# Patient Record
Sex: MALE | Race: WHITE | NOT HISPANIC OR LATINO | Employment: OTHER | URBAN - NONMETROPOLITAN AREA
[De-identification: names, ages, dates, MRNs, and addresses within clinical notes are randomized per-mention and may not be internally consistent; named-entity substitution may affect disease eponyms.]

---

## 2017-01-11 PROBLEM — N28.9 KIDNEY DISEASE: Status: ACTIVE | Noted: 2017-01-11

## 2017-01-11 PROBLEM — G47.33 OSA (OBSTRUCTIVE SLEEP APNEA): Status: ACTIVE | Noted: 2017-01-11

## 2017-01-11 PROBLEM — E66.9 OBESITY: Status: ACTIVE | Noted: 2017-01-11

## 2017-01-11 PROBLEM — I48.91 ATRIAL FIBRILLATION (HCC): Status: ACTIVE | Noted: 2017-01-11

## 2017-01-11 PROBLEM — E78.5 HLD (HYPERLIPIDEMIA): Status: ACTIVE | Noted: 2017-01-11

## 2017-01-11 PROBLEM — E11.9 DM (DIABETES MELLITUS) (HCC): Status: ACTIVE | Noted: 2017-01-11

## 2017-01-11 PROBLEM — I10 HTN (HYPERTENSION): Status: ACTIVE | Noted: 2017-01-11

## 2017-01-11 RX ORDER — CHLORAL HYDRATE 500 MG
CAPSULE ORAL 2 TIMES DAILY WITH MEALS
COMMUNITY

## 2017-01-11 RX ORDER — LORAZEPAM 0.5 MG/1
0.5 TABLET ORAL
COMMUNITY

## 2017-01-11 RX ORDER — BUDESONIDE AND FORMOTEROL FUMARATE DIHYDRATE 160; 4.5 UG/1; UG/1
2 AEROSOL RESPIRATORY (INHALATION)
COMMUNITY
End: 2017-01-12

## 2017-01-11 RX ORDER — FUROSEMIDE 40 MG/1
40 TABLET ORAL DAILY
COMMUNITY

## 2017-01-11 RX ORDER — ASPIRIN 81 MG/1
81 TABLET ORAL DAILY
COMMUNITY
End: 2020-01-01

## 2017-01-11 RX ORDER — ESZOPICLONE 3 MG/1
3 TABLET, FILM COATED ORAL NIGHTLY
COMMUNITY

## 2017-01-11 RX ORDER — CHOLECALCIFEROL (VITAMIN D3) 50 MCG
2000 TABLET ORAL DAILY
COMMUNITY
End: 2017-01-12

## 2017-01-11 RX ORDER — CALCIUM POLYCARBOPHIL 625 MG 625 MG/1
625 TABLET ORAL DAILY
COMMUNITY
End: 2017-01-12

## 2017-01-11 RX ORDER — ALLOPURINOL 100 MG/1
100 TABLET ORAL DAILY
COMMUNITY

## 2017-01-11 RX ORDER — POTASSIUM CHLORIDE 20 MEQ/1
20 TABLET, EXTENDED RELEASE ORAL 2 TIMES DAILY
COMMUNITY
End: 2017-01-12 | Stop reason: ALTCHOICE

## 2017-01-11 RX ORDER — CETIRIZINE HYDROCHLORIDE 10 MG/1
10 TABLET ORAL DAILY
COMMUNITY
End: 2017-07-28

## 2017-01-11 RX ORDER — PANTOPRAZOLE SODIUM 40 MG/1
40 TABLET, DELAYED RELEASE ORAL DAILY
COMMUNITY
End: 2017-01-12

## 2017-01-11 RX ORDER — CARVEDILOL 25 MG/1
25 TABLET ORAL 2 TIMES DAILY WITH MEALS
COMMUNITY

## 2017-01-11 RX ORDER — QUETIAPINE FUMARATE 100 MG/1
100 TABLET, FILM COATED ORAL NIGHTLY
COMMUNITY

## 2017-01-12 ENCOUNTER — OFFICE VISIT (OUTPATIENT)
Dept: OTOLARYNGOLOGY | Facility: CLINIC | Age: 67
End: 2017-01-12

## 2017-01-12 VITALS
WEIGHT: 284 LBS | HEART RATE: 84 BPM | TEMPERATURE: 98.8 F | SYSTOLIC BLOOD PRESSURE: 154 MMHG | DIASTOLIC BLOOD PRESSURE: 78 MMHG | HEIGHT: 73 IN | BODY MASS INDEX: 37.64 KG/M2

## 2017-01-12 DIAGNOSIS — K11.5 SIALOLITHIASIS: Primary | ICD-10-CM

## 2017-01-12 PROCEDURE — 1036F TOBACCO NON-USER: CPT | Performed by: OTOLARYNGOLOGY

## 2017-01-12 PROCEDURE — 99203 OFFICE O/P NEW LOW 30 MIN: CPT | Performed by: OTOLARYNGOLOGY

## 2017-01-12 RX ORDER — NIFEDIPINE 30 MG/1
30 TABLET, EXTENDED RELEASE ORAL DAILY
COMMUNITY
End: 2020-01-01

## 2017-01-12 RX ORDER — FLUTICASONE PROPIONATE 50 MCG
2 SPRAY, SUSPENSION (ML) NASAL AS NEEDED
COMMUNITY

## 2017-01-12 RX ORDER — ESOMEPRAZOLE MAGNESIUM 40 MG/1
40 CAPSULE, DELAYED RELEASE ORAL
COMMUNITY
End: 2017-07-28 | Stop reason: ALTCHOICE

## 2017-01-12 NOTE — LETTER
January 12, 2017     Ad Flaherty MD  [address] [facility] [address]    Patient: Neal Rock   YOB: 1950   Date of Visit: 1/12/2017       Dear Dr. Reynold MD:    Thank you for referring Neal Rock to me for evaluation. Below are the relevant portions of my assessment and plan of care.    If you have questions, please do not hesitate to call me. I look forward to following Neal along with you.         Sincerely,        Dov Ritter MD        CC: No Recipients  Dov Ritter MD  1/12/2017  3:41 PM  Signed  PRIMARY CARE PROVIDER: Ad Flaherty MD  REFERRING PROVIDER: No ref. provider found    Chief Complaint   Patient presents with   • Facial Swelling     Swelling in right side of face        Subjective   History of Present Illness:  Neal Rock is a  66 y.o.  male who complains of neck mass. The symptoms are localized to the right submandibular region with mild left submandibular swelling. The patient has had moderate symptoms. The symptoms have been acutely occuring for the last several months . The symptoms are aggravated by  no identifiable factors . The symptoms are improved by no identifieable factors. The patient complains of dry mouth.     Review of Systems:  Review of Systems   Constitutional: Negative for chills and fever.   HENT:        See HPI   Eyes: Negative.    Respiratory: Positive for shortness of breath. Negative for cough.    Cardiovascular: Negative.    Gastrointestinal: Negative for nausea and vomiting.   Allergic/Immunologic: Positive for environmental allergies. Negative for food allergies.   Neurological: Negative for dizziness and headaches.   Psychiatric/Behavioral: Negative.        Past History:  Past Medical History   Diagnosis Date   • Atrial fibrillation 01/11/2017   • Diverticulitis    • DM (diabetes mellitus) 01/11/2017   • HLD (hyperlipidemia) 01/11/2017   • HTN (hypertension) 01/11/2017   • Kidney disease 01/11/2017   • Obesity  01/11/2017   • BOB (obstructive sleep apnea) 01/11/2017     Past Surgical History   Procedure Laterality Date   • Bladder repair     • Colon surgery     • Back surgery     • Cardioversion     • Colonoscopy     • Eye surgery     • Hernia repair       No family history on file.  Social History   Substance Use Topics   • Smoking status: Former Smoker     Types: Cigarettes     Quit date: 1993   • Smokeless tobacco: Never Used   • Alcohol use 1.8 oz/week     3 Glasses of wine per week      Comment: Moderate        Current Outpatient Prescriptions:   •  allopurinol (ZYLOPRIM) 100 MG tablet, Take 100 mg by mouth Daily., Disp: , Rfl:   •  aspirin 81 MG EC tablet, Take 81 mg by mouth Daily., Disp: , Rfl:   •  carvedilol (COREG) 25 MG tablet, Take 25 mg by mouth 2 (Two) Times a Day With Meals., Disp: , Rfl:   •  cetirizine (zyrTEC) 10 MG tablet, Take 10 mg by mouth Daily., Disp: , Rfl:   •  esomeprazole (nexIUM) 40 MG capsule, Take 40 mg by mouth Every Morning Before Breakfast., Disp: , Rfl:   •  eszopiclone (LUNESTA) 3 MG tablet, Take 3 mg by mouth Every Night. Take immediately before bedtime, Disp: , Rfl:   •  fluticasone (FLONASE) 50 MCG/ACT nasal spray, 2 sprays into each nostril 2 (Two) Times a Day., Disp: , Rfl:   •  furosemide (LASIX) 20 MG tablet, Take 20 mg by mouth 2 (Two) Times a Day., Disp: , Rfl:   •  LORazepam (ATIVAN) 0.5 MG tablet, Take 0.5 mg by mouth Every 8 (Eight) Hours As Needed for anxiety., Disp: , Rfl:   •  magnesium oxide (MAGOX) 400 (241.3 MG) MG tablet tablet, Take 400 mg by mouth Daily., Disp: , Rfl:   •  Multiple Vitamin (MULTIVITAMINS PO), Take  by mouth., Disp: , Rfl:   •  NIFEdipine XL (PROCARDIA XL) 30 MG 24 hr tablet, Take 30 mg by mouth Daily., Disp: , Rfl:   •  Omega-3 Fatty Acids (FISH OIL) 1000 MG capsule capsule, Take  by mouth Daily With Breakfast., Disp: , Rfl:   •  Probiotic Product (PROBIOTIC PO), Take  by mouth., Disp: , Rfl:   •  psyllium (KONSYL) 28.3 % pack pack, Take  by mouth  Daily., Disp: , Rfl:   •  QUEtiapine (SEROquel) 25 MG tablet, Take 25 mg by mouth Every Night., Disp: , Rfl:   Allergies:  Ace inhibitors; Bee venom; Nsaids; and Wasp venom    Objective     Vital Signs:  Temp:  [98.8 °F (37.1 °C)] 98.8 °F (37.1 °C)  Heart Rate:  [84] 84  BP: (154)/(78) 154/78    Physical Exam:  CONSTITUTIONAL: well nourished, well-developed, alert, oriented, in no acute distress, obese  COMMUNICATION AND VOICE: able to communicate normally, normal voice quality  HEAD: normocephalic, no lesions, atraumatic, no tenderness, no masses   FACE: appearance normal, no lesions, no tenderness, no deformities, facial motion symmetric  SALIVARY GLANDS: parotid glands with no tenderness, no swelling, no masses, submandibular glands with prominent size upon palpation on right side, nontender, good salivary flow with palpation, no stones  EYES: ocular motility normal, eyelids normal, orbits normal, no proptosis, conjunctiva normal , pupils equal, round   EARS:  Hearing: response to conversational voice normal bilaterally   External Ears: auricles without lesions  Otoscopic: tympanic membrane appearance normal, no lesions, no perforation, normal mobility, no fluid  NOSE:  External Nose: structure normal, no tenderness on palpation, no nasal discharge, no lesions, no evidence of trauma, nostrils patent   Intranasal Exam: nasal mucosa dry, vestibule within normal limits, inferior turbinate normal, nasal septum midline   Nasopharynx: nasopharyngeal mucosa, adenoids within normal limits  ORAL:  Lips: upper and lower lips without lesion   Teeth: dentition within normal limits for age   Gums: gingivae healthy   Oral Mucosa: oral mucosa normal, no mucosal lesions   Floor of Mouth: Warthin’s duct patent, mucosa normal  Tongue: lingual mucosa normal without lesions, normal tongue mobility, enlarged in size  Palate: soft and hard palates with normal mucosa and structure  Oropharynx: oropharyngeal mucosa normal, tonsils  normal in appearance   HYPOPHARYNX: hypopharyngeal mucosa normal  LARYNX: epiglottis and arytenoid cartilage within normal limits, vocal cord mucosa normal with normal mobility   NECK: neck appearance normal, no masses or tenderness  THYROID: no overt thyromegaly, no tenderness, nodules or mass present on palpation, position midline   LYMPH NODES: no lymphadenopathy  CHEST/RESPIRATORY: respiratory effort normal, normal breath sounds   CARDIOVASCULAR: rate and rhythm normal, extremities without cyanosis or edema    NEUROLOGIC/PSYCHIATRIC: oriented to time, place and person, mood normal, affect appropriate, CN II-XII intact grossly    Results Review:   CT soft tissue neck reviewed:     Assessment   1. Sialolithiasis        Plan   Conservative management.    -----INSTRUCTIONS-----  Warm compresses three times a day with massage.  Use over the counter non steroidal anti inflammatory medication such as Motrin (ibuprofen) or Alieve/ Naprosyn (naproxen) as needed for pain and inflammation relief.  Use saliva producing measures with sour candy or the use of lemon with water.     -----FOLLOW UP-----  Return in about 4 months (around 5/12/2017) for with ARELI Dave.        Dov Ritter MD  01/12/17  3:40 PM

## 2017-01-12 NOTE — MR AVS SNAPSHOT
Neal Shweta   1/12/2017 3:00 PM   Office Visit    Dept Phone:  [phone]   Encounter #:  [id number]    Provider:  Dov Ritter MD   Department:  [facility]                Your Full Care Plan              Today's Medication Changes          These changes are accurate as of: 1/12/17  3:48 PM.  If you have any questions, ask your nurse or doctor.               Stop taking medication(s)listed here:     budesonide-formoterol 160-4.5 MCG/ACT inhaler   Commonly known as:  SYMBICORT   Stopped by:  Dov Ritter MD           pantoprazole 40 MG EC tablet   Commonly known as:  PROTONIX   Stopped by:  Dov Ritter MD           polycarbophil 625 MG tablet   Commonly known as:  FIBERCON   Stopped by:  Dov Ritter MD           potassium chloride 20 MEQ CR tablet   Commonly known as:  K-DUR,KLOR-CON   Stopped by:  Dov Ritter MD           Vitamin D 2000 UNITS tablet   Stopped by:  Dov Ritter MD                      Your Updated Medication List          This list is accurate as of: 1/12/17  3:48 PM.  Always use your most recent med list.                allopurinol 100 MG tablet   Commonly known as:  ZYLOPRIM       aspirin 81 MG EC tablet       carvedilol 25 MG tablet   Commonly known as:  COREG       cetirizine 10 MG tablet   Commonly known as:  zyrTEC       esomeprazole 40 MG capsule   Commonly known as:  nexIUM       fish oil 1000 MG capsule capsule       fluticasone 50 MCG/ACT nasal spray   Commonly known as:  FLONASE       furosemide 20 MG tablet   Commonly known as:  LASIX       LORazepam 0.5 MG tablet   Commonly known as:  ATIVAN       LUNESTA 3 MG tablet   Generic drug:  eszopiclone       magnesium oxide 400 (241.3 MG) MG tablet tablet   Commonly known as:  MAGOX       MULTIVITAMINS PO       NIFEdipine XL 30 MG 24 hr tablet   Commonly known as:  PROCARDIA XL       PROBIOTIC PO       psyllium 28.3 % pack pack   Commonly known  as:  KONSYL       QUEtiapine 25 MG tablet   Commonly known as:  SEROquel               You Were Diagnosed With        Codes Comments    Sialolithiasis    -  Primary ICD-10-CM: K11.5  ICD-9-CM: 527.5       Instructions    Warm compresses three times a day with massage.  Use over the counter non steroidal anti inflammatory medication such as Motrin (ibuprofen) or Alieve/ Naprosyn (naproxen) as needed for pain and inflammation relief.  Use saliva producing measures with sour candy or the use of lemon with water.     Patient Instructions History      Upcoming Appointments     Visit Type Date Time Department    NEW PATIENT 2017  3:00 PM [facility]    FOLLOW UP 2017  3:00 PM [facility]    FOLLOW UP 2017  3:30 PM [facility]      [employer] Signup     [facility] [employer] allows you to send messages to your doctor, view your test results, renew your prescriptions, schedule appointments, and more. To sign up, go to [employer] and click on the Sign Up Now link in the New User? box. Enter your [employer] Activation Code exactly as it appears below along with the last four digits of your Social Security Number and your Date of Birth () to complete the sign-up process. If you do not sign up before the expiration date, you must request a new code.    [employer] Activation Code: [email or web address]  Expires: 2017  3:48 PM    If you have questions, you can email [email or web address] or call [phone] to talk to our [employer] staff. Remember, [employer] is NOT to be used for urgent needs. For medical emergencies, dial 911.               Other Info from Your Visit           Your Appointments     2017  3:00 PM CST   Follow Up with [facility]   [facility] (--)    [address] [place] [address] [address]  [facility] [id number] [phone]           Arrive 15 minutes prior to appointment.            May 16, 2017  3:30 PM CDT   Follow Up with Daniela  "ARELI Berry   [facility] (--)    [address] [facility]   [address]  [facility] [address] [phone]           Arrive 15 minutes prior to appointment.              Allergies     Ace Inhibitors      Bee Venom      Nsaids      Wasp Venom        Reason for Visit     Facial Swelling Swelling in right side of face       Vital Signs     Blood Pressure Pulse Temperature Height Weight Body Mass Index    154/78 84 98.8 °F (37.1 °C) 73\" (185.4 cm) 284 lb (129 kg) 37.47 kg/m2    Smoking Status                   Former Smoker           Problems and Diagnoses Noted     Salivary gland stone        "

## 2017-01-12 NOTE — PATIENT INSTRUCTIONS
Warm compresses three times a day with massage.  Use over the counter non steroidal anti inflammatory medication such as Motrin (ibuprofen) or Alieve/ Naprosyn (naproxen) as needed for pain and inflammation relief.  Use saliva producing measures with sour candy or the use of lemon with water.

## 2017-01-12 NOTE — PROGRESS NOTES
PRIMARY CARE PROVIDER: Ad Flaherty MD  REFERRING PROVIDER: No ref. provider found    Chief Complaint   Patient presents with   • Facial Swelling     Swelling in right side of face        Subjective   History of Present Illness:  Neal Rock is a  66 y.o.  male who complains of neck mass. The symptoms are localized to the right submandibular region with mild left submandibular swelling. The patient has had moderate symptoms. The symptoms have been acutely occuring for the last several months . The symptoms are aggravated by  no identifiable factors . The symptoms are improved by no identifieable factors. The patient complains of dry mouth.     Review of Systems:  Review of Systems   Constitutional: Negative for chills and fever.   HENT:        See HPI   Eyes: Negative.    Respiratory: Positive for shortness of breath. Negative for cough.    Cardiovascular: Negative.    Gastrointestinal: Negative for nausea and vomiting.   Allergic/Immunologic: Positive for environmental allergies. Negative for food allergies.   Neurological: Negative for dizziness and headaches.   Psychiatric/Behavioral: Negative.        Past History:  Past Medical History   Diagnosis Date   • Atrial fibrillation 01/11/2017   • Diverticulitis    • DM (diabetes mellitus) 01/11/2017   • HLD (hyperlipidemia) 01/11/2017   • HTN (hypertension) 01/11/2017   • Kidney disease 01/11/2017   • Obesity 01/11/2017   • BOB (obstructive sleep apnea) 01/11/2017     Past Surgical History   Procedure Laterality Date   • Bladder repair     • Colon surgery     • Back surgery     • Cardioversion     • Colonoscopy     • Eye surgery     • Hernia repair       No family history on file.  Social History   Substance Use Topics   • Smoking status: Former Smoker     Types: Cigarettes     Quit date: 1993   • Smokeless tobacco: Never Used   • Alcohol use 1.8 oz/week     3 Glasses of wine per week      Comment: Moderate        Current Outpatient Prescriptions:   •   allopurinol (ZYLOPRIM) 100 MG tablet, Take 100 mg by mouth Daily., Disp: , Rfl:   •  aspirin 81 MG EC tablet, Take 81 mg by mouth Daily., Disp: , Rfl:   •  carvedilol (COREG) 25 MG tablet, Take 25 mg by mouth 2 (Two) Times a Day With Meals., Disp: , Rfl:   •  cetirizine (zyrTEC) 10 MG tablet, Take 10 mg by mouth Daily., Disp: , Rfl:   •  esomeprazole (nexIUM) 40 MG capsule, Take 40 mg by mouth Every Morning Before Breakfast., Disp: , Rfl:   •  eszopiclone (LUNESTA) 3 MG tablet, Take 3 mg by mouth Every Night. Take immediately before bedtime, Disp: , Rfl:   •  fluticasone (FLONASE) 50 MCG/ACT nasal spray, 2 sprays into each nostril 2 (Two) Times a Day., Disp: , Rfl:   •  furosemide (LASIX) 20 MG tablet, Take 20 mg by mouth 2 (Two) Times a Day., Disp: , Rfl:   •  LORazepam (ATIVAN) 0.5 MG tablet, Take 0.5 mg by mouth Every 8 (Eight) Hours As Needed for anxiety., Disp: , Rfl:   •  magnesium oxide (MAGOX) 400 (241.3 MG) MG tablet tablet, Take 400 mg by mouth Daily., Disp: , Rfl:   •  Multiple Vitamin (MULTIVITAMINS PO), Take  by mouth., Disp: , Rfl:   •  NIFEdipine XL (PROCARDIA XL) 30 MG 24 hr tablet, Take 30 mg by mouth Daily., Disp: , Rfl:   •  Omega-3 Fatty Acids (FISH OIL) 1000 MG capsule capsule, Take  by mouth Daily With Breakfast., Disp: , Rfl:   •  Probiotic Product (PROBIOTIC PO), Take  by mouth., Disp: , Rfl:   •  psyllium (KONSYL) 28.3 % pack pack, Take  by mouth Daily., Disp: , Rfl:   •  QUEtiapine (SEROquel) 25 MG tablet, Take 25 mg by mouth Every Night., Disp: , Rfl:   Allergies:  Ace inhibitors; Bee venom; Nsaids; and Wasp venom    Objective     Vital Signs:  Temp:  [98.8 °F (37.1 °C)] 98.8 °F (37.1 °C)  Heart Rate:  [84] 84  BP: (154)/(78) 154/78    Physical Exam:  CONSTITUTIONAL: well nourished, well-developed, alert, oriented, in no acute distress, obese  COMMUNICATION AND VOICE: able to communicate normally, normal voice quality  HEAD: normocephalic, no lesions, atraumatic, no tenderness, no masses    FACE: appearance normal, no lesions, no tenderness, no deformities, facial motion symmetric  SALIVARY GLANDS: parotid glands with no tenderness, no swelling, no masses, submandibular glands with prominent size upon palpation on right side, nontender, good salivary flow with palpation, no stones  EYES: ocular motility normal, eyelids normal, orbits normal, no proptosis, conjunctiva normal , pupils equal, round   EARS:  Hearing: response to conversational voice normal bilaterally   External Ears: auricles without lesions  Otoscopic: tympanic membrane appearance normal, no lesions, no perforation, normal mobility, no fluid  NOSE:  External Nose: structure normal, no tenderness on palpation, no nasal discharge, no lesions, no evidence of trauma, nostrils patent   Intranasal Exam: nasal mucosa dry, vestibule within normal limits, inferior turbinate normal, nasal septum midline   Nasopharynx: nasopharyngeal mucosa, adenoids within normal limits  ORAL:  Lips: upper and lower lips without lesion   Teeth: dentition within normal limits for age   Gums: gingivae healthy   Oral Mucosa: oral mucosa normal, no mucosal lesions   Floor of Mouth: Warthin’s duct patent, mucosa normal  Tongue: lingual mucosa normal without lesions, normal tongue mobility, enlarged in size  Palate: soft and hard palates with normal mucosa and structure  Oropharynx: oropharyngeal mucosa normal, tonsils normal in appearance   HYPOPHARYNX: hypopharyngeal mucosa normal  LARYNX: epiglottis and arytenoid cartilage within normal limits, vocal cord mucosa normal with normal mobility   NECK: neck appearance normal, no masses or tenderness  THYROID: no overt thyromegaly, no tenderness, nodules or mass present on palpation, position midline   LYMPH NODES: no lymphadenopathy  CHEST/RESPIRATORY: respiratory effort normal, normal breath sounds   CARDIOVASCULAR: rate and rhythm normal, extremities without cyanosis or edema    NEUROLOGIC/PSYCHIATRIC: oriented to  time, place and person, mood normal, affect appropriate, CN II-XII intact grossly    Results Review:   CT soft tissue neck reviewed:     Assessment   1. Sialolithiasis        Plan   Conservative management.    -----INSTRUCTIONS-----  Warm compresses three times a day with massage.  Use over the counter non steroidal anti inflammatory medication such as Motrin (ibuprofen) or Alieve/ Naprosyn (naproxen) as needed for pain and inflammation relief.  Use saliva producing measures with sour candy or the use of lemon with water.     -----FOLLOW UP-----  Return in about 4 months (around 5/12/2017) for with ARELI Dave.        Dov Ritter MD  01/12/17  3:40 PM

## 2017-01-18 ENCOUNTER — OFFICE VISIT (OUTPATIENT)
Dept: CARDIOLOGY | Facility: CLINIC | Age: 67
End: 2017-01-18

## 2017-01-18 VITALS
DIASTOLIC BLOOD PRESSURE: 68 MMHG | WEIGHT: 285.2 LBS | BODY MASS INDEX: 37.8 KG/M2 | HEART RATE: 82 BPM | HEIGHT: 73 IN | SYSTOLIC BLOOD PRESSURE: 160 MMHG

## 2017-01-18 DIAGNOSIS — I10 ESSENTIAL HYPERTENSION: ICD-10-CM

## 2017-01-18 DIAGNOSIS — R60.0 BILATERAL EDEMA OF LOWER EXTREMITY: ICD-10-CM

## 2017-01-18 DIAGNOSIS — I48.0 PAROXYSMAL ATRIAL FIBRILLATION (HCC): Primary | ICD-10-CM

## 2017-01-18 DIAGNOSIS — I51.89 DIASTOLIC DYSFUNCTION: ICD-10-CM

## 2017-01-18 DIAGNOSIS — E78.2 MIXED HYPERLIPIDEMIA: ICD-10-CM

## 2017-01-18 DIAGNOSIS — G47.33 OSA (OBSTRUCTIVE SLEEP APNEA): ICD-10-CM

## 2017-01-18 PROBLEM — N28.9 KIDNEY DISEASE: Status: RESOLVED | Noted: 2017-01-11 | Resolved: 2017-01-18

## 2017-01-18 PROCEDURE — 99214 OFFICE O/P EST MOD 30 MIN: CPT | Performed by: PHYSICIAN ASSISTANT

## 2017-01-18 PROCEDURE — 93000 ELECTROCARDIOGRAM COMPLETE: CPT | Performed by: PHYSICIAN ASSISTANT

## 2017-01-18 RX ORDER — FERROUS SULFATE 325(65) MG
325 TABLET ORAL EVERY OTHER DAY
COMMUNITY
End: 2018-07-30

## 2017-01-18 NOTE — PROGRESS NOTES
Subjective:     Encounter Date:01/18/2017      Patient ID: Neal Rock is a 66 y.o. male with history of diastolic dysfunction, HTN, HLD, BOB, pAF who [facility]. The pt tells me he's been doing well since his last visit. He denies any CP, dyspnea, palpitations, dizziness, or other associated symptom. He also denies tobacco abuse. He does report BLE edema, increased more than usual. He states this has been present x a couple months now. The pt also tells me that he has had a recent hernia repair in July and that he had complications with bleeding, which required transfusion with 2 units. He has since had residual fatigue, decreasing with PT. The pt had an episode of AFib over 1 year ago and was placed on Coumadin for subsequent DCCV. It was successful and Dr. Soni took him off of Coumadin a few weeks afterwards.   Pt reports he will have lipid panel from PCP faxed to our office.   Of note, the pt had a cardiac cath performed in 2015 that showed revealed normal coronary arteries     Chief Complaint:  Atrial Fibrillation   Presents for follow-up visit. Symptoms are negative for chest pain, dizziness, palpitations, shortness of breath and syncope. Past medical history includes atrial fibrillation and hyperlipidemia.   Hypertension   This is a chronic problem. The problem is controlled. Associated symptoms include malaise/fatigue. Pertinent negatives include no chest pain, orthopnea, palpitations, PND or shortness of breath. There is no history of chronic renal disease.   Hyperlipidemia   This is a chronic problem. The current episode started more than 1 year ago. The problem is controlled. Recent lipid tests were reviewed and are normal. He has no history of chronic renal disease or diabetes. Pertinent negatives include no chest pain, focal weakness, myalgias or shortness of breath.       The following portions of the patient's history were reviewed and updated as  appropriate: allergies, current medications, past family history, past medical history, past social history, past surgical history and problem list.    Review of Systems   Constitution: Positive for malaise/fatigue. Negative for weight gain.   Cardiovascular: Positive for leg swelling. Negative for chest pain, claudication, dyspnea on exertion, irregular heartbeat, near-syncope, orthopnea, palpitations, paroxysmal nocturnal dyspnea and syncope.   Respiratory: Negative for hemoptysis and shortness of breath.    Hematologic/Lymphatic: Negative for bleeding problem. Does not bruise/bleed easily.   Skin: Negative for rash.   Musculoskeletal: Negative for myalgias.   Gastrointestinal: Negative for melena, nausea and vomiting.   Genitourinary: Negative for hematuria.   Neurological: Negative for dizziness, focal weakness and light-headedness.   Psychiatric/Behavioral: Negative for depression. The patient is not nervous/anxious.    All other systems reviewed and are negative.        ECG 12 Lead  Date/Time: 1/18/2017 4:19 PM  Performed by: ERICA TOVAR  Authorized by: ERICA TOVAR   Comparison: compared with previous ECG from 11/11/2014  Similar to previous ECG  Comparison to previous ECG: Similar ST elevation in inferior leads. No PACs on previous  Rhythm: sinus rhythm  Ectopy: atrial premature contractions  Rate: normal  ST Elevation: II, III and aVF  QRS axis: normal                 Objective:     Physical Exam   Constitutional: He is oriented to person, place, and time. He appears well-developed and well-nourished.   HENT:   Head: Normocephalic and atraumatic.   Eyes: Conjunctivae and EOM are normal. Pupils are equal, round, and reactive to light.   Neck: Normal range of motion. Neck supple. No JVD present.   Cardiovascular: Normal rate, regular rhythm, S1 normal, S2 normal, normal heart sounds, intact distal pulses and normal pulses.    No murmur heard.  Pulmonary/Chest: Effort normal and breath sounds normal. No  respiratory distress.   Abdominal: Soft. Bowel sounds are normal. He exhibits no distension.   Musculoskeletal: He exhibits edema (trace BLE).   Neurological: He is alert and oriented to person, place, and time.   Skin: Skin is warm and dry.   Psychiatric: He has a normal mood and affect. Judgment normal.   Vitals reviewed.      Lab Review: will have lipid panel faxed      Assessment:          Diagnosis Plan   1. Paroxysmal atrial fibrillation  ECG 12 Lead   2. Essential hypertension      SBP 150s-160s average    3. Mixed hyperlipidemia      Checked by PCP recently. Upper limit of normal, per patient.    4. OBB (obstructive sleep apnea)      uses CPAP   5. Diastolic dysfunction  Adult Transthoracic Echo Complete With Contrast   6. Bilateral edema of lower extremity  Adult Transthoracic Echo Complete With Contrast          Plan:       1. Increase lasix to 40 mg daily  2. Echo (pt requests in [place], [place]), given hx of diastolic dysfn, and worsening edema  3. Will hold on anticoagulation for now, given recent hx of bleeding and need for transfusion. Additionally, his CHADS score at this time is 1. Will reevaluate again in the future.   4. Follow-up in 6 months, unless needed sooner  5. Instructed on diet, activity, medication compliance  6. Verbalized understanding of instructions

## 2017-01-18 NOTE — MR AVS SNAPSHOT
Neal Rock   1/18/2017 3:00 PM   Office Visit    Dept Phone:  [phone]   Encounter #:  [id number]    Provider:  [facility] NP   Department:  [facility]                Your Full Care Plan              Your Updated Medication List          This list is accurate as of: 1/18/17  4:25 PM.  Always use your most recent med list.                allopurinol 100 MG tablet   Commonly known as:  ZYLOPRIM       aspirin 81 MG EC tablet       carvedilol 25 MG tablet   Commonly known as:  COREG       cetirizine 10 MG tablet   Commonly known as:  zyrTEC       esomeprazole 40 MG capsule   Commonly known as:  nexIUM       ferrous sulfate 325 (65 FE) MG tablet       fish oil 1000 MG capsule capsule       fluticasone 50 MCG/ACT nasal spray   Commonly known as:  FLONASE       furosemide 20 MG tablet   Commonly known as:  LASIX       LORazepam 0.5 MG tablet   Commonly known as:  ATIVAN       LUNESTA 3 MG tablet   Generic drug:  eszopiclone       magnesium oxide 400 (241.3 MG) MG tablet tablet   Commonly known as:  MAGOX       MULTIVITAMINS PO       NIFEdipine XL 30 MG 24 hr tablet   Commonly known as:  PROCARDIA XL       PROBIOTIC PO       psyllium 28.3 % pack pack   Commonly known as:  KONSYL       QUEtiapine 25 MG tablet   Commonly known as:  SEROquel               We Performed the Following     ECG 12 Lead       You Were Diagnosed With        Codes Comments    Paroxysmal atrial fibrillation    -  Primary ICD-10-CM: I48.0  ICD-9-CM: 427.31     Essential hypertension     ICD-10-CM: I10  ICD-9-CM: 401.9 SBP 150s-160s average     Mixed hyperlipidemia     ICD-10-CM: E78.2  ICD-9-CM: 272.2 Checked by PCP recently. Upper limit of normal, per patient.     BOB (obstructive sleep apnea)     ICD-10-CM: G47.33  ICD-9-CM: 327.23 uses CPAP    Diastolic dysfunction     ICD-10-CM: I51.9  ICD-9-CM: 429.9     Bilateral edema of lower extremity     ICD-10-CM: R60.0  ICD-9-CM: 782.3          Instructions     None    Patient Instructions History      Upcoming Appointments     Visit Type Date Time Department    FOLLOW UP 2017  3:00 PM [facility]    FOLLOW UP 2017  3:30 PM [facility]    FOLLOW UP 2017  2:30 PM [facility]      MyChart Signup     [facility] allows you to send messages to your doctor, view your test results, renew your prescriptions, schedule appointments, and more. To sign up, go to [employer] and click on the Sign Up Now link in the New User? box. Enter your Intr[employer] Activation Code exactly as it appears below along with the last four digits of your Social Security Number and your Date of Birth () to complete the sign-up process. If you do not sign up before the expiration date, you must request a new code.    Intr[employer]iem Activation Code: [email or web address]  Expires: 2017  3:48 PM    If you have questions, you can email [email or web address] or call [phone] to talk to our Intrad[employer] staff. Remember, Intr[employer] is NOT to be used for urgent needs. For medical emergencies, dial 911.               Other Info from Your Visit           Your Appointments     May 16, 2017  3:30 PM CDT   Follow Up with ARELI Valdivia   [facility] (--)    [address]   [address]  [place] [address] [phone]           Arrive 15 minutes prior to appointment.            2017  2:30 PM CDT   Follow Up with [facility] NP   [facility] (--)    [address]  [place] [address] [phone]           Arrive 15 minutes prior to appointment.              Allergies     Ace Inhibitors      Bee Venom      Nsaids      Wasp Venom        Reason for Visit     Atrial Fibrillation 1 1/2 yr F/U. No trouble since cardioversion    Shortness of Breath Has had for a long time.    Hypertension Has been elevated      Vital Signs     Blood Pressure Pulse Height  "Weight Body Mass Index Smoking Status    160/68 82 73\" (185.4 cm) 285 lb 3.2 oz (129 kg) 37.63 kg/m2 Former Smoker      Problems and Diagnoses Noted     High cholesterol or triglycerides    High blood pressure    BOB (obstructive sleep apnea)    Atrial fibrillation (irregular heartbeat)    Diastolic dysfunction        Edema of both legs          No Longer an Issue     Kidney disorder        "

## 2017-04-10 ENCOUNTER — OFFICE VISIT (OUTPATIENT)
Dept: VASCULAR SURGERY | Age: 67
End: 2017-04-10
Payer: MEDICARE

## 2017-04-10 ENCOUNTER — HOSPITAL ENCOUNTER (OUTPATIENT)
Dept: VASCULAR LAB | Age: 67
Discharge: HOME | End: 2017-04-10
Payer: MEDICARE

## 2017-04-10 VITALS
OXYGEN SATURATION: 93 % | TEMPERATURE: 98.8 F | SYSTOLIC BLOOD PRESSURE: 132 MMHG | RESPIRATION RATE: 18 BRPM | DIASTOLIC BLOOD PRESSURE: 70 MMHG | HEART RATE: 69 BPM

## 2017-04-10 DIAGNOSIS — I65.23 BILATERAL CAROTID ARTERY STENOSIS: ICD-10-CM

## 2017-04-10 DIAGNOSIS — I65.23 BILATERAL CAROTID ARTERY STENOSIS: Primary | ICD-10-CM

## 2017-04-10 PROCEDURE — G8427 DOCREV CUR MEDS BY ELIG CLIN: HCPCS | Performed by: NURSE PRACTITIONER

## 2017-04-10 PROCEDURE — 93880 EXTRACRANIAL BILAT STUDY: CPT

## 2017-04-10 PROCEDURE — 99212 OFFICE O/P EST SF 10 MIN: CPT | Performed by: NURSE PRACTITIONER

## 2017-04-10 PROCEDURE — 4040F PNEUMOC VAC/ADMIN/RCVD: CPT | Performed by: NURSE PRACTITIONER

## 2017-04-10 PROCEDURE — G8598 ASA/ANTIPLAT THER USED: HCPCS | Performed by: NURSE PRACTITIONER

## 2017-04-10 PROCEDURE — G8421 BMI NOT CALCULATED: HCPCS | Performed by: NURSE PRACTITIONER

## 2017-04-10 PROCEDURE — 3017F COLORECTAL CA SCREEN DOC REV: CPT | Performed by: NURSE PRACTITIONER

## 2017-04-10 PROCEDURE — 1123F ACP DISCUSS/DSCN MKR DOCD: CPT | Performed by: NURSE PRACTITIONER

## 2017-04-10 PROCEDURE — 1036F TOBACCO NON-USER: CPT | Performed by: NURSE PRACTITIONER

## 2017-04-10 RX ORDER — FUROSEMIDE 40 MG/1
40 TABLET ORAL DAILY PRN
COMMUNITY

## 2017-05-16 ENCOUNTER — OFFICE VISIT (OUTPATIENT)
Dept: OTOLARYNGOLOGY | Facility: CLINIC | Age: 67
End: 2017-05-16

## 2017-05-16 VITALS
WEIGHT: 290 LBS | BODY MASS INDEX: 38.43 KG/M2 | TEMPERATURE: 99 F | DIASTOLIC BLOOD PRESSURE: 66 MMHG | HEART RATE: 73 BPM | HEIGHT: 73 IN | SYSTOLIC BLOOD PRESSURE: 121 MMHG

## 2017-05-16 DIAGNOSIS — J30.9 ALLERGIC RHINITIS, UNSPECIFIED ALLERGIC RHINITIS TRIGGER, UNSPECIFIED RHINITIS SEASONALITY: Primary | ICD-10-CM

## 2017-05-16 DIAGNOSIS — K21.9 GASTROESOPHAGEAL REFLUX DISEASE, ESOPHAGITIS PRESENCE NOT SPECIFIED: ICD-10-CM

## 2017-05-16 PROCEDURE — 99213 OFFICE O/P EST LOW 20 MIN: CPT | Performed by: NURSE PRACTITIONER

## 2017-05-16 RX ORDER — AZELASTINE 1 MG/ML
2 SPRAY, METERED NASAL 2 TIMES DAILY
Qty: 30 ML | Refills: 6 | Status: SHIPPED | OUTPATIENT
Start: 2017-05-16 | End: 2017-06-15

## 2017-07-28 ENCOUNTER — OFFICE VISIT (OUTPATIENT)
Dept: CARDIOLOGY | Facility: CLINIC | Age: 67
End: 2017-07-28

## 2017-07-28 VITALS
HEART RATE: 74 BPM | RESPIRATION RATE: 18 BRPM | WEIGHT: 290 LBS | DIASTOLIC BLOOD PRESSURE: 80 MMHG | BODY MASS INDEX: 38.43 KG/M2 | OXYGEN SATURATION: 97 % | SYSTOLIC BLOOD PRESSURE: 140 MMHG | HEIGHT: 73 IN

## 2017-07-28 DIAGNOSIS — E66.01 MORBID OBESITY DUE TO EXCESS CALORIES (HCC): ICD-10-CM

## 2017-07-28 DIAGNOSIS — I48.0 PAROXYSMAL ATRIAL FIBRILLATION (HCC): Primary | ICD-10-CM

## 2017-07-28 DIAGNOSIS — G47.33 OSA (OBSTRUCTIVE SLEEP APNEA): ICD-10-CM

## 2017-07-28 DIAGNOSIS — I10 ESSENTIAL HYPERTENSION: ICD-10-CM

## 2017-07-28 DIAGNOSIS — E78.2 MIXED HYPERLIPIDEMIA: ICD-10-CM

## 2017-07-28 DIAGNOSIS — N18.9 CHRONIC KIDNEY DISEASE, UNSPECIFIED STAGE: ICD-10-CM

## 2017-07-28 PROCEDURE — 93000 ELECTROCARDIOGRAM COMPLETE: CPT | Performed by: NURSE PRACTITIONER

## 2017-07-28 PROCEDURE — 99214 OFFICE O/P EST MOD 30 MIN: CPT | Performed by: NURSE PRACTITIONER

## 2017-07-28 RX ORDER — PANTOPRAZOLE SODIUM 40 MG/1
40 TABLET, DELAYED RELEASE ORAL DAILY
COMMUNITY

## 2017-07-28 RX ORDER — FEXOFENADINE HYDROCHLORIDE 60 MG/1
60 TABLET, FILM COATED ORAL AS NEEDED
COMMUNITY
End: 2020-01-01

## 2017-07-28 NOTE — PROGRESS NOTES
Subjective:     Encounter Date:07/28/2017      Patient ID: Neal Rock is a 67 y.o. male.    Chief Complaint:  History of Present Illness  Patient is here for 6 month follow up. Overall patient has been doing well. Maintaining NSR denies palpitations. Reviewed echo which was normal. Patient had lasix increased at last office visit and patient saw minimal relief on swelling on legs, states it may be worse. He has started wearing compression stockings which have helped- no swelling noted today on exam. States it helped his shortness of breath some, but this is chronic and mostly unchanged. Patient has held off on anticoagulation after bleeding issues. Patient is followed by renal and primary closely. Compliant with CPAP machine.  Overall doing well.     The following portions of the patient's history were reviewed and updated as appropriate: allergies, current medications, past family history, past medical history, past social history, past surgical history and problem list.   Prior to Admission medications    Medication Sig Start Date End Date Taking? Authorizing Provider   allopurinol (ZYLOPRIM) 100 MG tablet Take 100 mg by mouth Daily.   Yes Historical Provider, MD   aspirin 81 MG EC tablet Take 81 mg by mouth Daily.   Yes Historical Provider, MD   carvedilol (COREG) 25 MG tablet Take 25 mg by mouth 2 (Two) Times a Day With Meals.   Yes Historical Provider, MD   eszopiclone (LUNESTA) 3 MG tablet Take 3 mg by mouth Every Night. Take immediately before bedtime   Yes Historical Provider, MD   ferrous sulfate 325 (65 FE) MG tablet Take 325 mg by mouth Every Other Day.   Yes Historical Provider, MD   fexofenadine (ALLEGRA) 60 MG tablet Take 60 mg by mouth Daily.   Yes Historical Provider, MD   fluticasone (FLONASE) 50 MCG/ACT nasal spray 2 sprays into each nostril 2 (Two) Times a Day.   Yes Historical Provider, MD   furosemide (LASIX) 20 MG tablet Take 20 mg by mouth Daily. Begin taking 2 pills daily   Yes  Historical Provider, MD   LORazepam (ATIVAN) 0.5 MG tablet Take 0.5 mg by mouth every night at bedtime.   Yes Historical Provider, MD   magnesium oxide (MAGOX) 400 (241.3 MG) MG tablet tablet Take 400 mg by mouth 3 (Three) Times a Day.   Yes Historical Provider, MD   Multiple Vitamin (MULTIVITAMINS PO) Take  by mouth.   Yes Historical Provider, MD   NIFEdipine XL (PROCARDIA XL) 30 MG 24 hr tablet Take 30 mg by mouth Daily.   Yes Historical Provider, MD   Omega-3 Fatty Acids (FISH OIL) 1000 MG capsule capsule Take  by mouth 2 (Two) Times a Day With Meals.   Yes Historical Provider, MD   pantoprazole (PROTONIX) 40 MG EC tablet Take 40 mg by mouth Daily.   Yes Historical Provider, MD   Probiotic Product (PROBIOTIC PO) Take  by mouth.   Yes Historical Provider, MD   psyllium (KONSYL) 28.3 % pack pack Take  by mouth Daily As Needed.   Yes Historical Provider, MD   QUEtiapine (SEROquel) 25 MG tablet Take 25 mg by mouth Every Night.   Yes Historical Provider, MD   cetirizine (zyrTEC) 10 MG tablet Take 10 mg by mouth Daily.  7/28/17  Historical Provider, MD   esomeprazole (nexIUM) 40 MG capsule Take 40 mg by mouth Every Morning Before Breakfast.  7/28/17  Historical Provider, MD     Past Medical History:   Diagnosis Date   • Atrial fibrillation 01/11/2017   • Diverticulitis    • DM (diabetes mellitus) 01/11/2017   • HLD (hyperlipidemia) 01/11/2017   • HTN (hypertension) 01/11/2017   • Kidney disease 01/11/2017   • Obesity 01/11/2017   • BOB (obstructive sleep apnea) 01/11/2017   • Salivary gland infections    • Sialolithiasis 1/12/2017       Review of Systems   Constitution: Positive for weakness and malaise/fatigue. Negative for chills, decreased appetite, fever, weight gain and weight loss.   HENT: Negative for headaches and nosebleeds.    Eyes: Negative for visual disturbance.   Cardiovascular: Positive for leg swelling. Negative for chest pain, dyspnea on exertion, near-syncope, orthopnea, palpitations, paroxysmal  "nocturnal dyspnea and syncope.   Respiratory: Positive for shortness of breath. Negative for cough, hemoptysis and snoring.    Endocrine: Negative for cold intolerance and heat intolerance.   Hematologic/Lymphatic: Negative for bleeding problem. Does not bruise/bleed easily.   Skin: Negative for rash.   Musculoskeletal: Negative for back pain and falls.   Gastrointestinal: Negative for abdominal pain, constipation, diarrhea, heartburn, melena, nausea and vomiting.   Genitourinary: Negative for hematuria.   Neurological: Negative for dizziness and light-headedness.   Psychiatric/Behavioral: Negative for altered mental status.   Allergic/Immunologic: Negative for persistent infections.         ECG 12 Lead  Date/Time: 7/28/2017 2:41 PM  Performed by: ALISSA HARDY  Authorized by: ALISSA HARDY   Comparison: compared with previous ECG from 1/18/2017  Similar to previous ECG  Comparison to previous ECG: Prolonged   Rhythm: sinus rhythm               Objective:     Physical Exam   Constitutional: He is oriented to person, place, and time. He appears well-developed and well-nourished.   HENT:   Head: Normocephalic and atraumatic.   Eyes: Pupils are equal, round, and reactive to light.   Neck: Normal range of motion. Neck supple. No JVD present. Carotid bruit is not present.   Cardiovascular: Normal rate, regular rhythm, normal heart sounds and intact distal pulses.    Trace edema to lower legs, stockings in place   Pulmonary/Chest: Effort normal and breath sounds normal.   Abdominal: Soft. Bowel sounds are normal.   obese   Musculoskeletal: Normal range of motion.   Neurological: He is alert and oriented to person, place, and time. He has normal reflexes.   Skin: Skin is warm and dry.   Psychiatric: He has a normal mood and affect. His behavior is normal. Judgment and thought content normal.     Blood pressure 140/80, pulse 74, resp. rate 18, height 73\" (185.4 cm), weight 290 lb (132 kg), SpO2 97 %.      Lab " Review:       Assessment:          Diagnosis Plan   1. Paroxysmal atrial fibrillation     2. Essential hypertension     3. Mixed hyperlipidemia     4. BOB (obstructive sleep apnea)     5. Chronic kidney disease, unspecified stage     6. Morbid obesity due to excess calories            Plan:       1. Paroxysmal Atrial Fibrillation - maintaining NSR since cardioversion- not anticoagulated after remaining NSR and history of anemia secondary to blood loss requiring transfusion. Will leave off as he is maintaining NSR.  2. Blood Pressure borderline high, states improved and being managed by PCP and renal  3. Lipids followed by PCP  4. BOB and obesity may be contributing to shortness of breath, chronic and unchanged with lasix.    5. CKD- reports recent checks have been good  6. Encouraged healthy diet and exercise

## 2017-11-16 ENCOUNTER — OFFICE VISIT (OUTPATIENT)
Dept: OTOLARYNGOLOGY | Facility: CLINIC | Age: 67
End: 2017-11-16

## 2017-11-16 VITALS
DIASTOLIC BLOOD PRESSURE: 70 MMHG | SYSTOLIC BLOOD PRESSURE: 158 MMHG | WEIGHT: 294.2 LBS | HEIGHT: 73 IN | TEMPERATURE: 98.3 F | BODY MASS INDEX: 38.99 KG/M2

## 2017-11-16 DIAGNOSIS — K11.5 SIALOLITHIASIS: Primary | ICD-10-CM

## 2017-11-16 PROCEDURE — 1036F TOBACCO NON-USER: CPT | Performed by: OTOLARYNGOLOGY

## 2017-11-16 PROCEDURE — 99213 OFFICE O/P EST LOW 20 MIN: CPT | Performed by: OTOLARYNGOLOGY

## 2017-11-16 RX ORDER — POTASSIUM CHLORIDE 750 MG/1
1 TABLET, EXTENDED RELEASE ORAL
COMMUNITY
Start: 2017-08-31

## 2017-11-16 NOTE — PROGRESS NOTES
Patient Care Team:  Ad Flaherty MD as PCP - General (Family Medicine)  Dov Ritter MD as PCP - Internal Medicine (Otolaryngology)  Luís Lo MD as Consulting Physician (Cardiology)  ARELI Valdivia as Nurse Practitioner (Otolaryngology)    Chief Complaint   Patient presents with   • Follow-up     Sialolithiasis       Subjective   HPI   Neal Rock is a  66 y.o.  male who is here for follow up of recurrent right submandibular gland swelling.  He was last seen on 05/16/2017 and had reported no major flareups.  He was treated for allergic rhinitis that time. He has had no current complaints. He denies recent flair ups of symptoms. He has had a stone come out recently.    Review of Systems:  Reviewed per patient intake note    Past History:  Past Medical History:   Diagnosis Date   • Atrial fibrillation 01/11/2017   • Diverticulitis    • DM (diabetes mellitus) 01/11/2017   • HLD (hyperlipidemia) 01/11/2017   • HTN (hypertension) 01/11/2017   • Kidney disease 01/11/2017   • Obesity 01/11/2017   • BOB (obstructive sleep apnea) 01/11/2017   • Salivary gland infections    • Sialolithiasis 1/12/2017     Past Surgical History:   Procedure Laterality Date   • BACK SURGERY     • BLADDER REPAIR     • CARDIOVERSION     • COLON SURGERY     • COLONOSCOPY     • EYE SURGERY     • HERNIA REPAIR       Family History   Problem Relation Age of Onset   • Hypertension Mother    • Cancer Mother    • Cancer Father    • Cancer Sister    • Cancer Brother      Social History   Substance Use Topics   • Smoking status: Former Smoker     Types: Cigarettes     Quit date: 1993   • Smokeless tobacco: Never Used   • Alcohol use 0.6 oz/week     1 Glasses of wine per week      Comment: Moderate      Current Outpatient Prescriptions on File Prior to Visit   Medication Sig   • allopurinol (ZYLOPRIM) 100 MG tablet Take 100 mg by mouth Daily.   • aspirin 81 MG EC tablet Take 81 mg by mouth Daily.   • carvedilol (COREG) 25 MG  tablet Take 25 mg by mouth 2 (Two) Times a Day With Meals.   • eszopiclone (LUNESTA) 3 MG tablet Take 3 mg by mouth Every Night. Take immediately before bedtime   • fexofenadine (ALLEGRA) 60 MG tablet Take 60 mg by mouth As Needed.   • furosemide (LASIX) 20 MG tablet Take 20 mg by mouth Daily. Begin taking 2 pills daily   • LORazepam (ATIVAN) 0.5 MG tablet Take 0.5 mg by mouth every night at bedtime.   • magnesium oxide (MAGOX) 400 (241.3 MG) MG tablet tablet Take 400 mg by mouth 3 (Three) Times a Day.   • Multiple Vitamin (MULTIVITAMINS PO) Take  by mouth.   • NIFEdipine XL (PROCARDIA XL) 30 MG 24 hr tablet Take 30 mg by mouth Daily.   • Omega-3 Fatty Acids (FISH OIL) 1000 MG capsule capsule Take  by mouth 2 (Two) Times a Day With Meals.   • pantoprazole (PROTONIX) 40 MG EC tablet Take 40 mg by mouth Daily.   • Probiotic Product (PROBIOTIC PO) Take  by mouth.   • psyllium (KONSYL) 28.3 % pack pack Take  by mouth Daily As Needed.   • QUEtiapine (SEROquel) 25 MG tablet Take 25 mg by mouth Every Night.   • ferrous sulfate 325 (65 FE) MG tablet Take 325 mg by mouth Every Other Day.   • fluticasone (FLONASE) 50 MCG/ACT nasal spray 2 sprays into each nostril 2 (Two) Times a Day.     No current facility-administered medications on file prior to visit.      Allergies:  Lisinopril; Ace inhibitors; Bee venom; Nsaids; and Wasp venom    Objective      Vital Signs:   Temp:  [98.3 °F (36.8 °C)] 98.3 °F (36.8 °C)  BP: (158)/(70) 158/70    Physical Exam   CONSTITUTIONAL: well nourished, well-developed, alert, oriented, in no acute distress   COMMUNICATION AND VOICE: able to communicate normally, normal voice quality  HEAD: normocephalic, no lesions, atraumatic, no tenderness, no masses   FACE: appearance normal, no lesions, no tenderness, no deformities, facial motion symmetric  SALIVARY GLANDS: parotid glands with no tenderness, no swelling, no masses, submandibular glands with normal size, nontender  EYES: ocular motility  normal, eyelids normal, orbits normal, no proptosis, conjunctiva normal , pupils equal, round  HEARING: response to conversational voice normal bilaterally   EXTERNAL EARS: auricles without lesions  EXTERNAL NOSE: structure normal, no tenderness on palpation, no nasal discharge, no lesions, no evidence of trauma, nostrils patent  LIPS: structure normal, no tenderness on palpation, no lesions, no evidence of trauma  NECK: neck appearance normal  LYMPH NODES: no lymphadenopathy  CHEST/RESPIRATORY: respiratory effort normal  CARDIOVASCULAR: extremities without cyanosis or edema, no overt jugulovenous distension present  NEUROLOGIC/PSYCHIATRIC: oriented appropriately for age, mood normal, affect appropriate, cranial nerves intact grossly unless specifically mentioned above         Assessment   1. Sialolithiasis Inactive       Plan    Conservative management.    QUALITY MEASURES   Body Mass Index Screening and Follow-Up Plan   Body mass index is 38.82 kg/(m^2).  Diet and generalized activity/ exersize handouts given on AVS.    Tobacco Use: Screening and Cessation Intervention     Smoking status: Former Smoker                                                              Packs/day: 0.00      Years: 0.00         Types: Cigarettes     Quit date: 1993  Smokeless status: Never Used                            Return in about 6 months (around 5/16/2018) for follow up with midlevel.    Dov Ritter MD  11/16/17  3:20 PM

## 2017-11-16 NOTE — PROGRESS NOTES
Patient Intake Note    Review of Systems  Review of Systems   Constitutional: Negative for chills, fatigue and fever.   HENT:        See HPI   Respiratory: Positive for shortness of breath. Negative for cough, choking and wheezing.    Cardiovascular: Negative.    Gastrointestinal: Negative for constipation, diarrhea, nausea and vomiting.   Allergic/Immunologic: Negative for environmental allergies and food allergies.   Neurological: Negative for dizziness, numbness and headaches.   Hematological: Bruises/bleeds easily.   Psychiatric/Behavioral: Positive for sleep disturbance.         Erna Salas  11/16/2017  2:55 PM

## 2018-04-17 ENCOUNTER — HOSPITAL ENCOUNTER (OUTPATIENT)
Dept: VASCULAR LAB | Age: 68
Discharge: HOME | End: 2018-04-17
Payer: MEDICARE

## 2018-04-17 ENCOUNTER — OFFICE VISIT (OUTPATIENT)
Dept: VASCULAR SURGERY | Age: 68
End: 2018-04-17
Payer: MEDICARE

## 2018-04-17 VITALS
DIASTOLIC BLOOD PRESSURE: 63 MMHG | SYSTOLIC BLOOD PRESSURE: 118 MMHG | HEART RATE: 69 BPM | RESPIRATION RATE: 18 BRPM | TEMPERATURE: 98.6 F

## 2018-04-17 DIAGNOSIS — I65.23 BILATERAL CAROTID ARTERY STENOSIS: ICD-10-CM

## 2018-04-17 DIAGNOSIS — I65.23 BILATERAL CAROTID ARTERY STENOSIS: Primary | ICD-10-CM

## 2018-04-17 PROCEDURE — 99213 OFFICE O/P EST LOW 20 MIN: CPT | Performed by: PHYSICIAN ASSISTANT

## 2018-04-17 PROCEDURE — 4040F PNEUMOC VAC/ADMIN/RCVD: CPT | Performed by: PHYSICIAN ASSISTANT

## 2018-04-17 PROCEDURE — G8598 ASA/ANTIPLAT THER USED: HCPCS | Performed by: PHYSICIAN ASSISTANT

## 2018-04-17 PROCEDURE — 93880 EXTRACRANIAL BILAT STUDY: CPT

## 2018-04-17 PROCEDURE — G8427 DOCREV CUR MEDS BY ELIG CLIN: HCPCS | Performed by: PHYSICIAN ASSISTANT

## 2018-04-17 PROCEDURE — G8421 BMI NOT CALCULATED: HCPCS | Performed by: PHYSICIAN ASSISTANT

## 2018-04-17 PROCEDURE — 1123F ACP DISCUSS/DSCN MKR DOCD: CPT | Performed by: PHYSICIAN ASSISTANT

## 2018-04-17 PROCEDURE — 3017F COLORECTAL CA SCREEN DOC REV: CPT | Performed by: PHYSICIAN ASSISTANT

## 2018-04-17 PROCEDURE — 1036F TOBACCO NON-USER: CPT | Performed by: PHYSICIAN ASSISTANT

## 2018-04-17 RX ORDER — LATANOPROST 50 UG/ML
1 SOLUTION/ DROPS OPHTHALMIC NIGHTLY
COMMUNITY

## 2018-04-17 RX ORDER — FLUTICASONE PROPIONATE 50 MCG
2 SPRAY, SUSPENSION (ML) NASAL
COMMUNITY

## 2018-04-17 RX ORDER — FEXOFENADINE HYDROCHLORIDE 60 MG/1
60 TABLET, FILM COATED ORAL
COMMUNITY

## 2018-05-16 ENCOUNTER — OFFICE VISIT (OUTPATIENT)
Dept: OTOLARYNGOLOGY | Facility: CLINIC | Age: 68
End: 2018-05-16

## 2018-05-16 VITALS
HEIGHT: 73 IN | BODY MASS INDEX: 39.89 KG/M2 | SYSTOLIC BLOOD PRESSURE: 138 MMHG | TEMPERATURE: 98.6 F | WEIGHT: 301 LBS | DIASTOLIC BLOOD PRESSURE: 70 MMHG | HEART RATE: 76 BPM

## 2018-05-16 DIAGNOSIS — K11.5 SIALOLITHIASIS: ICD-10-CM

## 2018-05-16 DIAGNOSIS — J30.9 CHRONIC ALLERGIC RHINITIS, UNSPECIFIED SEASONALITY, UNSPECIFIED TRIGGER: Primary | ICD-10-CM

## 2018-05-16 DIAGNOSIS — G47.33 OSA (OBSTRUCTIVE SLEEP APNEA): ICD-10-CM

## 2018-05-16 DIAGNOSIS — K21.9 GASTROESOPHAGEAL REFLUX DISEASE, ESOPHAGITIS PRESENCE NOT SPECIFIED: ICD-10-CM

## 2018-05-16 PROCEDURE — 99214 OFFICE O/P EST MOD 30 MIN: CPT | Performed by: PHYSICIAN ASSISTANT

## 2018-05-16 NOTE — PROGRESS NOTES
YOB: 1950  Location: [facility]  Location Address: [address] [place] [address], [facility], [address] [place] [address]  Location Phone: [phone]    Chief Complaint   Patient presents with   • Follow-up       History of Present Illness  Neal Rock is a 67 y.o. male.  Neal Rock is here for follow up of ENT complaints. The patient has had problems with allergies and a history of salivary gland stones.  The symptoms are not localized to a particular location. The patient has had stable and resolved symptoms. The symptoms have been resolved and well controlled for the last several months The symptoms are aggravated by  no identifiable factors. The symptoms are improved by medications and conservative management.       Past Medical History:   Diagnosis Date   • Allergic rhinitis 2017   • Atrial fibrillation 2017   • Diverticulitis    • DM (diabetes mellitus) 2017   • HLD (hyperlipidemia) 2017   • HTN (hypertension) 2017   • Kidney disease 2017   • Obesity 2017   • BOB (obstructive sleep apnea) 2017   • Salivary gland infections    • Sialolithiasis 2017       Past Surgical History:   Procedure Laterality Date   • BACK SURGERY     • BLADDER REPAIR     • CARDIOVERSION     • COLON SURGERY     • COLONOSCOPY     • EYE SURGERY     • HERNIA REPAIR         Outpatient Prescriptions Marked as Taking for the 18 encounter (Office Visit) with RADHA Patel   Medication Sig Dispense Refill   • allopurinol (ZYLOPRIM) 100 MG tablet Take 100 mg by mouth Daily.     • aspirin 81 MG EC tablet Take 81 mg by mouth Daily.     • carvedilol (COREG) 25 MG tablet Take 25 mg by mouth 2 (Two) Times a Day With Meals.     • eszopiclone (LUNESTA) 3 MG tablet Take 3 mg by mouth Every Night. Take immediately before bedtime     • ferrous sulfate 325 (65 FE) MG tablet Take 325 mg by mouth Every Other Day.     • fexofenadine (ALLEGRA) 60 MG tablet Take 60 mg by mouth As Needed.      • fluticasone (FLONASE) 50 MCG/ACT nasal spray 2 sprays into each nostril 2 (Two) Times a Day.     • furosemide (LASIX) 20 MG tablet Take 20 mg by mouth Daily. Begin taking 2 pills daily     • LORazepam (ATIVAN) 0.5 MG tablet Take 0.5 mg by mouth every night at bedtime.     • magnesium oxide (MAGOX) 400 (241.3 MG) MG tablet tablet Take 400 mg by mouth 3 (Three) Times a Day.     • Multiple Vitamin (MULTIVITAMINS PO) Take  by mouth.     • mupirocin (BACTROBAN) 2 % ointment      • NIFEdipine XL (PROCARDIA XL) 30 MG 24 hr tablet Take 30 mg by mouth Daily.     • Omega-3 Fatty Acids (FISH OIL) 1000 MG capsule capsule Take  by mouth 2 (Two) Times a Day With Meals.     • pantoprazole (PROTONIX) 40 MG EC tablet Take 40 mg by mouth Daily.     • potassium chloride (K-DUR,KLOR-CON) 10 MEQ CR tablet Take 1 tablet by mouth.     • Probiotic Product (PROBIOTIC PO) Take  by mouth.     • psyllium (KONSYL) 28.3 % pack pack Take  by mouth Daily As Needed.     • QUEtiapine (SEROquel) 25 MG tablet Take 25 mg by mouth Every Night.         Lisinopril; Ace inhibitors; Bee venom; Nsaids; and Wasp venom    Family History   Problem Relation Age of Onset   • Hypertension Mother    • Cancer Mother    • Cancer Father    • Cancer Sister    • Cancer Brother        Social History     Social History   • Marital status:      Spouse name: N/A   • Number of children: N/A   • Years of education: N/A     Occupational History   • Not on file.     Social History Main Topics   • Smoking status: Former Smoker     Types: Cigarettes     Quit date: 1993   • Smokeless tobacco: Never Used   • Alcohol use 0.6 oz/week     1 Glasses of wine per week      Comment: Moderate    • Drug use: No   • Sexual activity: Defer     Other Topics Concern   • Not on file     Social History Narrative   • No narrative on file       Review of Systems   Constitutional: Negative for activity change, appetite change, chills, diaphoresis, fatigue, fever and unexpected weight  change.   HENT: Negative for congestion, ear discharge, ear pain, facial swelling, hearing loss, mouth sores, nosebleeds, postnasal drip, rhinorrhea, sinus pressure, sneezing, sore throat, tinnitus, trouble swallowing and voice change.    Eyes: Negative for pain, discharge, redness, itching and visual disturbance.   Respiratory: Negative for apnea, cough, choking, chest tightness, shortness of breath, wheezing and stridor.    Gastrointestinal: Negative for nausea and vomiting.   Endocrine: Negative for cold intolerance and heat intolerance.   Musculoskeletal: Negative for arthralgias, back pain, gait problem, neck pain and neck stiffness.   Skin: Negative for rash.   Allergic/Immunologic: Positive for environmental allergies. Negative for food allergies.   Neurological: Negative for dizziness, tremors, seizures, syncope, facial asymmetry, speech difficulty, weakness, light-headedness, numbness and headaches.   Hematological: Negative for adenopathy. Does not bruise/bleed easily.   Psychiatric/Behavioral: Negative for behavioral problems, sleep disturbance and suicidal ideas. The patient is not nervous/anxious and is not hyperactive.        Vitals:    05/16/18 1255   BP: 138/70   Pulse: 76   Temp: 98.6 °F (37 °C)       Body mass index is 39.71 kg/m².    Objective     Physical Exam  CONSTITUTIONAL: well nourished, alert, oriented, in no acute distress     COMMUNICATION AND VOICE: able to communicate normally, normal voice quality    HEAD: normocephalic, no lesions, atraumatic, no tenderness, no masses     FACE: appearance normal, no lesions, no tenderness, no deformities, facial motion symmetric    SALIVARY GLANDS: parotid glands with no tenderness, no swelling, no masses, submandibular glands with normal size, nontender    EYES: ocular motility normal, eyelids normal, orbits normal, no proptosis, conjunctiva normal , pupils equal, round     EARS:  Hearing: response to conversational voice normal bilaterally    External Ears: auricles without lesions  Otoscopic: tympanic membrane appearance normal, no lesions, no perforation, normal mobility, no fluid    NOSE:  External Nose: structure normal, no tenderness on palpation, no nasal discharge, no lesions, no evidence of trauma, nostrils patent   Intranasal Exam: nasal mucosa normal, vestibule within normal limits, inferior turbinate normal, nasal septum midline   Nasopharynx:     ORAL:  Lips: upper and lower lips without lesion   Teeth: dentition within normal limits for age   Gums: gingivae healthy   Oral Mucosa: oral mucosa normal, no mucosal lesions   Floor of Mouth: Warthin’s duct patent, mucosa normal  Tongue: lingual mucosa normal without lesions, normal tongue mobility   Palate: soft and hard palates with normal mucosa and structure  Oropharynx: oropharyngeal mucosa normal    NECK: neck appearance normal, no mass,  noted without erythema or tenderness    THYROID: no overt thyromegaly, no tenderness, nodules or mass present on palpation, position midline     LYMPH NODES: no lymphadenopathy    CHEST/RESPIRATORY: respiratory effort normal, normal breath sounds     CARDIOVASCULAR: rate and rhythm normal, extremities without cyanosis or edema      NEUROLOGIC/PSYCHIATRIC: oriented to time, place and person, mood normal, affect appropriate, CN II-XII intact grossly    Assessment/Plan   Problems Addressed this Visit        Respiratory    BOB (obstructive sleep apnea)    Allergic rhinitis - Primary       Digestive    GERD (gastroesophageal reflux disease)       Other    Sialolithiasis        * Surgery not found *  No orders of the defined types were placed in this encounter.    Return if symptoms worsen or fail to improve.       Patient Instructions   Continue conservative management with massage, hydration, warm compresses, and sour theo/limes. Continue medications as directed.    ###### BMI  #####   MyPlate from USDA  The general, healthful diet is based on the 2010  Dietary Guidelines for Americans. The amount of food you need to eat from each food group depends on your age, sex, and level of physical activity and can be individualized by a dietitian. Go to ChooseMyPlate.gov for more information.  What do I need to know about the MyPlate plan?  · Enjoy your food, but eat less.  · Avoid oversized portions.  ¨ ½ of your plate should include fruits and vegetables.  ¨ ¼ of your plate should be grains.  ¨ ¼ of your plate should be protein.  Grains   · Make at least half of your grains whole grains.  · For a 2,000 calorie daily food plan, eat 6 oz every day.  · 1 oz is about 1 slice bread, 1 cup cereal, or ½ cup cooked rice, cereal, or pasta.  Vegetables   · Make half your plate fruits and vegetables.  · For a 2,000 calorie daily food plan, eat 2½ cups every day.  · 1 cup is about 1 cup raw or cooked vegetables or vegetable juice or 2 cups raw leafy greens.  Fruits   · Make half your plate fruits and vegetables.  · For a 2,000 calorie daily food plan, eat 2 cups every day.  · 1 cup is about 1 cup fruit or 100% fruit juice or ½ cup dried fruit.  Protein   · For a 2,000 calorie daily food plan, eat 5½ oz every day.  · 1 oz is about 1 oz meat, poultry, or fish, ¼ cup cooked beans, 1 egg, 1 Tbsp peanut butter, or ½ oz nuts or seeds.  Dairy   · Switch to fat-free or low-fat (1%) milk.  · For a 2,000 calorie daily food plan, eat 3 cups every day.  · 1 cup is about 1 cup milk or yogurt or soy milk (soy beverage), 1½ oz natural cheese, or 2 oz processed cheese.  Fats, Oils, and Empty Calories   · Only small amounts of oils are recommended.  · Empty calories are calories from solid fats or added sugars.  · Compare sodium in foods like soup, bread, and frozen meals. Choose the foods with lower numbers.  · Drink water instead of sugary drinks.  What foods can I eat?  Grains   Whole grains such as whole wheat, quinoa, millet, and bulgur. Bread, rolls, and pasta made from whole grains. Brown or  wild rice. Hot or cold cereals made from whole grains and without added sugar.  Vegetables   All fresh vegetables, especially fresh red, dark green, or orange vegetables. Peas and beans. Low-sodium frozen or canned vegetables prepared without added salt. Low-sodium vegetable juices.  Fruits   All fresh, frozen, and dried fruits. Canned fruit packed in water or fruit juice without added sugar. Fruit juices without added sugar.  Meats and Other Protein Sources   Boiled, baked, or grilled lean meat trimmed of fat. Skinless poultry. Fresh seafood and shellfish. Canned seafood packed in water. Unsalted nuts and unsalted nut butters. Tofu. Dried beans and pea. Eggs.  Dairy   Low-fat or fat-free milk, yogurt, and cheeses.  Sweets and Desserts   Frozen desserts made from low-fat milk.  Fats and Oils   Olive, peanut, and canola oils and margarine. Salad dressing and mayonnaise made from these oils.  Other   Soups and casseroles made from allowed ingredients and without added fat or salt.  The items listed above may not be a complete list of recommended foods or beverages. Contact your dietitian for more options.   What foods are not recommended?  Grains   Sweetened, low-fiber cereals. Packaged baked goods. Snack crackers and chips. Cheese crackers, butter crackers, and biscuits. Frozen waffles, sweet breads, doughnuts, pastries, packaged baking mixes, pancakes, cakes, and cookies.  Vegetables   Regular canned or frozen vegetables or vegetables prepared with salt. Canned tomatoes. Canned tomato sauce. Fried vegetables. Vegetables in cream sauce or cheese sauce.  Fruits   Fruits packed in syrup or made with added sugar.  Meats and Other Protein Sources   Marbled or fatty meats such as ribs. Poultry with skin. Fried meats, poultry, eggs, or fish. Sausages, hot dogs, and deli meats such as pastrami, bologna, or salami.  Dairy   Whole milk, cream, cheeses made from whole milk, sour cream. Ice cream or yogurt made from whole milk  or with added sugar.  Beverages   For adults, no more than one alcoholic drink per day. Regular soft drinks or other sugary beverages. Juice drinks.  Sweets and Desserts   Sugary or fatty desserts, candy, and other sweets.  Fats and Oils   Solid shortening or partially hydrogenated oils. Solid margarine. Margarine that contains trans fats. Butter.  The items listed above may not be a complete list of foods and beverages to avoid. Contact your dietitian for more information.   This information is not intended to replace advice given to you by your health care provider. Make sure you discuss any questions you have with your health care provider.  Document Released: 01/06/2009 Document Revised: 05/25/2017 Document Reviewed: 11/26/2014  [employer] Interactive Patient Education © 2017 [employer]     Calorie Counting for Weight Loss  Calories are units of energy. Your body needs a certain amount of calories from food to keep you going throughout the day. When you eat more calories than your body needs, your body stores the extra calories as fat. When you eat fewer calories than your body needs, your body burns fat to get the energy it needs.  Calorie counting means keeping track of how many calories you eat and drink each day. Calorie counting can be helpful if you need to lose weight. If you make sure to eat fewer calories than your body needs, you should lose weight. Ask your health care provider what a healthy weight is for you.  For calorie counting to work, you will need to eat the right number of calories in a day in order to lose a healthy amount of weight per week. A dietitian can help you determine how many calories you need in a day and will give you suggestions on how to reach your calorie goal.  · A healthy amount of weight to lose per week is usually 1-2 lb (0.5-0.9 kg). This usually means that your daily calorie intake should be reduced by 500-750 calories.  · Eating 1,200 - 1,500 calories per day can help  most women lose weight.  · Eating 1,500 - 1,800 calories per day can help most men lose weight.  What is my plan?  My goal is to have __________ calories per day.  If I have this many calories per day, I should lose around __________ pounds per week.  What do I need to know about calorie counting?  In order to meet your daily calorie goal, you will need to:  · Find out how many calories are in each food you would like to eat. Try to do this before you eat.  · Decide how much of the food you plan to eat.  · Write down what you ate and how many calories it had. Doing this is called keeping a food log.  To successfully lose weight, it is important to balance calorie counting with a healthy lifestyle that includes regular activity. Aim for 150 minutes of moderate exercise (such as walking) or 75 minutes of vigorous exercise (such as running) each week.  Where do I find calorie information?     The number of calories in a food can be found on a Nutrition Facts label. If a food does not have a Nutrition Facts label, try to look up the calories online or ask your dietitian for help.  Remember that calories are listed per serving. If you choose to have more than one serving of a food, you will have to multiply the calories per serving by the amount of servings you plan to eat. For example, the label on a package of bread might say that a serving size is 1 slice and that there are 90 calories in a serving. If you eat 1 slice, you will have eaten 90 calories. If you eat 2 slices, you will have eaten 180 calories.  How do I keep a food log?  Immediately after each meal, record the following information in your food log:  · What you ate. Don't forget to include toppings, sauces, and other extras on the food.  · How much you ate. This can be measured in cups, ounces, or number of items.  · How many calories each food and drink had.  · The total number of calories in the meal.  Keep your food log near you, such as in a small  "notebook in your pocket, or use a mobile jewel or website. Some programs will calculate calories for you and show you how many calories you have left for the day to meet your goal.  What are some calorie counting tips?  · Use your calories on foods and drinks that will fill you up and not leave you hungry:  ¨ Some examples of foods that fill you up are nuts and nut butters, vegetables, lean proteins, and high-fiber foods like whole grains. High-fiber foods are foods with more than 5 g fiber per serving.  ¨ Drinks such as sodas, specialty coffee drinks, alcohol, and juices have a lot of calories, yet do not fill you up.  · Eat nutritious foods and avoid empty calories. Empty calories are calories you get from foods or beverages that do not have many vitamins or protein, such as candy, sweets, and soda. It is better to have a nutritious high-calorie food (such as an avocado) than a food with few nutrients (such as a bag of chips).  · Know how many calories are in the foods you eat most often. This will help you calculate calorie counts faster.  · Pay attention to calories in drinks. Low-calorie drinks include water and unsweetened drinks.  · Pay attention to nutrition labels for \"low fat\" or \"fat free\" foods. These foods sometimes have the same amount of calories or more calories than the full fat versions. They also often have added sugar, starch, or salt, to make up for flavor that was removed with the fat.  · Find a way of tracking calories that works for you. Get creative. Try different apps or programs if writing down calories does not work for you.  What are some portion control tips?  · Know how many calories are in a serving. This will help you know how many servings of a certain food you can have.  · Use a measuring cup to measure serving sizes. You could also try weighing out portions on a kitchen scale. With time, you will be able to estimate serving sizes for some foods.  · Take some time to put servings of " different foods on your favorite plates, bowls, and cups so you know what a serving looks like.  · Try not to eat straight from a bag or box. Doing this can lead to overeating. Put the amount you would like to eat in a cup or on a plate to make sure you are eating the right portion.  · Use smaller plates, glasses, and bowls to prevent overeating.  · Try not to multitask (for example, watch TV or use your computer) while eating. If it is time to eat, sit down at a table and enjoy your food. This will help you to know when you are full. It will also help you to be aware of what you are eating and how much you are eating.  What are tips for following this plan?  Reading food labels   · Check the calorie count compared to the serving size. The serving size may be smaller than what you are used to eating.  · Check the source of the calories. Make sure the food you are eating is high in vitamins and protein and low in saturated and trans fats.  Shopping   · Read nutrition labels while you shop. This will help you make healthy decisions before you decide to purchase your food.  · Make a grocery list and stick to it.  Cooking   · Try to cook your favorite foods in a healthier way. For example, try baking instead of frying.  · Use low-fat dairy products.  Meal planning   · Use more fruits and vegetables. Half of your plate should be fruits and vegetables.  · Include lean proteins like poultry and fish.  How do I count calories when eating out?  · Ask for smaller portion sizes.  · Consider sharing an entree and sides instead of getting your own entree.  · If you get your own entree, eat only half. Ask for a box at the beginning of your meal and put the rest of your entree in it so you are not tempted to eat it.  · If calories are listed on the menu, choose the lower calorie options.  · Choose dishes that include vegetables, fruits, whole grains, low-fat dairy products, and lean protein.  · Choose items that are boiled,  broiled, grilled, or steamed. Stay away from items that are buttered, battered, fried, or served with cream sauce. Items labeled “crispy” are usually fried, unless stated otherwise.  · Choose water, low-fat milk, unsweetened iced tea, or other drinks without added sugar. If you want an alcoholic beverage, choose a lower calorie option such as a glass of wine or light beer.  · Ask for dressings, sauces, and syrups on the side. These are usually high in calories, so you should limit the amount you eat.  · If you want a salad, choose a garden salad and ask for grilled meats. Avoid extra toppings like gold, cheese, or fried items. Ask for the dressing on the side, or ask for olive oil and vinegar or lemon to use as dressing.  · Estimate how many servings of a food you are given. For example, a serving of cooked rice is ½ cup or about the size of half a baseball. Knowing serving sizes will help you be aware of how much food you are eating at restaurants. The list below tells you how big or small some common portion sizes are based on everyday objects:  ¨ 1 oz--4 stacked dice.  ¨ 3 oz--1 deck of cards.  ¨ 1 tsp--1 die.  ¨ 1 Tbsp--½ a ping-pong ball.  ¨ 2 Tbsp--1 ping-pong ball.  ¨ ½ cup--½ baseball.  ¨ 1 cup--1 baseball.  Summary  · Calorie counting means keeping track of how many calories you eat and drink each day. If you eat fewer calories than your body needs, you should lose weight.  · A healthy amount of weight to lose per week is usually 1-2 lb (0.5-0.9 kg). This usually means reducing your daily calorie intake by 500-750 calories.  · The number of calories in a food can be found on a Nutrition Facts label. If a food does not have a Nutrition Facts label, try to look up the calories online or ask your dietitian for help.  · Use your calories on foods and drinks that will fill you up, and not on foods and drinks that will leave you hungry.  · Use smaller plates, glasses, and bowls to prevent overeating.  This  information is not intended to replace advice given to you by your health care provider. Make sure you discuss any questions you have with your health care provider.  Document Released: 12/18/2006 Document Revised: 11/17/2017 Document Reviewed: 11/17/2017  [employer] Interactive Patient Education © 2017 [employer]     Exercising to Lose Weight  Exercising can help you to lose weight. In order to lose weight through exercise, you need to do vigorous-intensity exercise. You can tell that you are exercising with vigorous intensity if you are breathing very hard and fast and cannot hold a conversation while exercising.  Moderate-intensity exercise helps to maintain your current weight. You can tell that you are exercising at a moderate level if you have a higher heart rate and faster breathing, but you are still able to hold a conversation.  How often should I exercise?  Choose an activity that you enjoy and set realistic goals. Your health care provider can help you to make an activity plan that works for you. Exercise regularly as directed by your health care provider. This may include:  · Doing resistance training twice each week, such as:  ¨ Push-ups.  ¨ Sit-ups.  ¨ Lifting weights.  ¨ Using resistance bands.  · Doing a given intensity of exercise for a given amount of time. Choose from these options:  ¨ 150 minutes of moderate-intensity exercise every week.  ¨ 75 minutes of vigorous-intensity exercise every week.  ¨ A mix of moderate-intensity and vigorous-intensity exercise every week.  Children, pregnant women, people who are out of shape, people who are overweight, and older adults may need to consult a health care provider for individual recommendations. If you have any sort of medical condition, be sure to consult your health care provider before starting a new exercise program.  What are some activities that can help me to lose weight?  · Walking at a rate of at least 4.5 miles an hour.  · Jogging or running  at a rate of 5 miles per hour.  · Biking at a rate of at least 10 miles per hour.  · Lap swimming.  · Roller-skating or in-line skating.  · Cross-country skiing.  · Vigorous competitive sports, such as football, basketball, and soccer.  · Jumping rope.  · Aerobic dancing.  How can I be more active in my day-to-day activities?  · Use the stairs instead of the elevator.  · Take a walk during your lunch break.  · If you drive, park your car farther away from work or school.  · If you take public transportation, get off one stop early and walk the rest of the way.  · Make all of your phone calls while standing up and walking around.  · Get up, stretch, and walk around every 30 minutes throughout the day.  What guidelines should I follow while exercising?  · Do not exercise so much that you hurt yourself, feel dizzy, or get very short of breath.  · Consult your health care provider prior to starting a new exercise program.  · Wear comfortable clothes and shoes with good support.  · Drink plenty of water while you exercise to prevent dehydration or heat stroke. Body water is lost during exercise and must be replaced.  · Work out until you breathe faster and your heart beats faster.  This information is not intended to replace advice given to you by your health care provider. Make sure you discuss any questions you have with your health care provider.  Document Released: 01/20/2012 Document Revised: 05/25/2017 Document Reviewed: 05/21/2015  [employer] Interactive Patient Education © 2017 [employer]

## 2018-05-16 NOTE — PATIENT INSTRUCTIONS
Continue conservative management with massage, hydration, warm compresses, and sour theo/limes. Continue medications as directed.    ###### BMI  #####   MyPlate from [employer]  The general, healthful diet is based on the 2010 Dietary Guidelines for Americans. The amount of food you need to eat from each food group depends on your age, sex, and level of physical activity and can be individualized by a dietitian. Go to ChooseMyPlate.gov for more information.  What do I need to know about the MyPlate plan?  · Enjoy your food, but eat less.  · Avoid oversized portions.  ¨ ½ of your plate should include fruits and vegetables.  ¨ ¼ of your plate should be grains.  ¨ ¼ of your plate should be protein.  Grains   · Make at least half of your grains whole grains.  · For a 2,000 calorie daily food plan, eat 6 oz every day.  · 1 oz is about 1 slice bread, 1 cup cereal, or ½ cup cooked rice, cereal, or pasta.  Vegetables   · Make half your plate fruits and vegetables.  · For a 2,000 calorie daily food plan, eat 2½ cups every day.  · 1 cup is about 1 cup raw or cooked vegetables or vegetable juice or 2 cups raw leafy greens.  Fruits   · Make half your plate fruits and vegetables.  · For a 2,000 calorie daily food plan, eat 2 cups every day.  · 1 cup is about 1 cup fruit or 100% fruit juice or ½ cup dried fruit.  Protein   · For a 2,000 calorie daily food plan, eat 5½ oz every day.  · 1 oz is about 1 oz meat, poultry, or fish, ¼ cup cooked beans, 1 egg, 1 Tbsp peanut butter, or ½ oz nuts or seeds.  Dairy   · Switch to fat-free or low-fat (1%) milk.  · For a 2,000 calorie daily food plan, eat 3 cups every day.  · 1 cup is about 1 cup milk or yogurt or soy milk (soy beverage), 1½ oz natural cheese, or 2 oz processed cheese.  Fats, Oils, and Empty Calories   · Only small amounts of oils are recommended.  · Empty calories are calories from solid fats or added sugars.  · Compare sodium in foods like soup, bread, and frozen meals. Choose  the foods with lower numbers.  · Drink water instead of sugary drinks.  What foods can I eat?  Grains   Whole grains such as whole wheat, quinoa, millet, and bulgur. Bread, rolls, and pasta made from whole grains. Brown or wild rice. Hot or cold cereals made from whole grains and without added sugar.  Vegetables   All fresh vegetables, especially fresh red, dark green, or orange vegetables. Peas and beans. Low-sodium frozen or canned vegetables prepared without added salt. Low-sodium vegetable juices.  Fruits   All fresh, frozen, and dried fruits. Canned fruit packed in water or fruit juice without added sugar. Fruit juices without added sugar.  Meats and Other Protein Sources   Boiled, baked, or grilled lean meat trimmed of fat. Skinless poultry. Fresh seafood and shellfish. Canned seafood packed in water. Unsalted nuts and unsalted nut butters. Tofu. Dried beans and pea. Eggs.  Dairy   Low-fat or fat-free milk, yogurt, and cheeses.  Sweets and Desserts   Frozen desserts made from low-fat milk.  Fats and Oils   Olive, peanut, and canola oils and margarine. Salad dressing and mayonnaise made from these oils.  Other   Soups and casseroles made from allowed ingredients and without added fat or salt.  The items listed above may not be a complete list of recommended foods or beverages. Contact your dietitian for more options.   What foods are not recommended?  Grains   Sweetened, low-fiber cereals. Packaged baked goods. Snack crackers and chips. Cheese crackers, butter crackers, and biscuits. Frozen waffles, sweet breads, doughnuts, pastries, packaged baking mixes, pancakes, cakes, and cookies.  Vegetables   Regular canned or frozen vegetables or vegetables prepared with salt. Canned tomatoes. Canned tomato sauce. Fried vegetables. Vegetables in cream sauce or cheese sauce.  Fruits   Fruits packed in syrup or made with added sugar.  Meats and Other Protein Sources   Marbled or fatty meats such as ribs. Poultry with  skin. Fried meats, poultry, eggs, or fish. Sausages, hot dogs, and deli meats such as pastrami, bologna, or salami.  Dairy   Whole milk, cream, cheeses made from whole milk, sour cream. Ice cream or yogurt made from whole milk or with added sugar.  Beverages   For adults, no more than one alcoholic drink per day. Regular soft drinks or other sugary beverages. Juice drinks.  Sweets and Desserts   Sugary or fatty desserts, candy, and other sweets.  Fats and Oils   Solid shortening or partially hydrogenated oils. Solid margarine. Margarine that contains trans fats. Butter.  The items listed above may not be a complete list of foods and beverages to avoid. Contact your dietitian for more information.   This information is not intended to replace advice given to you by your health care provider. Make sure you discuss any questions you have with your health care provider.  Document Released: 01/06/2009 Document Revised: 05/25/2017 Document Reviewed: 11/26/2014  [employer] Interactive Patient Education © 2017 [employer]     Calorie Counting for Weight Loss  Calories are units of energy. Your body needs a certain amount of calories from food to keep you going throughout the day. When you eat more calories than your body needs, your body stores the extra calories as fat. When you eat fewer calories than your body needs, your body burns fat to get the energy it needs.  Calorie counting means keeping track of how many calories you eat and drink each day. Calorie counting can be helpful if you need to lose weight. If you make sure to eat fewer calories than your body needs, you should lose weight. Ask your health care provider what a healthy weight is for you.  For calorie counting to work, you will need to eat the right number of calories in a day in order to lose a healthy amount of weight per week. A dietitian can help you determine how many calories you need in a day and will give you suggestions on how to reach your  calorie goal.  · A healthy amount of weight to lose per week is usually 1-2 lb (0.5-0.9 kg). This usually means that your daily calorie intake should be reduced by 500-750 calories.  · Eating 1,200 - 1,500 calories per day can help most women lose weight.  · Eating 1,500 - 1,800 calories per day can help most men lose weight.  What is my plan?  My goal is to have __________ calories per day.  If I have this many calories per day, I should lose around __________ pounds per week.  What do I need to know about calorie counting?  In order to meet your daily calorie goal, you will need to:  · Find out how many calories are in each food you would like to eat. Try to do this before you eat.  · Decide how much of the food you plan to eat.  · Write down what you ate and how many calories it had. Doing this is called keeping a food log.  To successfully lose weight, it is important to balance calorie counting with a healthy lifestyle that includes regular activity. Aim for 150 minutes of moderate exercise (such as walking) or 75 minutes of vigorous exercise (such as running) each week.  Where do I find calorie information?     The number of calories in a food can be found on a Nutrition Facts label. If a food does not have a Nutrition Facts label, try to look up the calories online or ask your dietitian for help.  Remember that calories are listed per serving. If you choose to have more than one serving of a food, you will have to multiply the calories per serving by the amount of servings you plan to eat. For example, the label on a package of bread might say that a serving size is 1 slice and that there are 90 calories in a serving. If you eat 1 slice, you will have eaten 90 calories. If you eat 2 slices, you will have eaten 180 calories.  How do I keep a food log?  Immediately after each meal, record the following information in your food log:  · What you ate. Don't forget to include toppings, sauces, and other extras on  "the food.  · How much you ate. This can be measured in cups, ounces, or number of items.  · How many calories each food and drink had.  · The total number of calories in the meal.  Keep your food log near you, such as in a small notebook in your pocket, or use a mobile jewel or website. Some programs will calculate calories for you and show you how many calories you have left for the day to meet your goal.  What are some calorie counting tips?  · Use your calories on foods and drinks that will fill you up and not leave you hungry:  ¨ Some examples of foods that fill you up are nuts and nut butters, vegetables, lean proteins, and high-fiber foods like whole grains. High-fiber foods are foods with more than 5 g fiber per serving.  ¨ Drinks such as sodas, specialty coffee drinks, alcohol, and juices have a lot of calories, yet do not fill you up.  · Eat nutritious foods and avoid empty calories. Empty calories are calories you get from foods or beverages that do not have many vitamins or protein, such as candy, sweets, and soda. It is better to have a nutritious high-calorie food (such as an avocado) than a food with few nutrients (such as a bag of chips).  · Know how many calories are in the foods you eat most often. This will help you calculate calorie counts faster.  · Pay attention to calories in drinks. Low-calorie drinks include water and unsweetened drinks.  · Pay attention to nutrition labels for \"low fat\" or \"fat free\" foods. These foods sometimes have the same amount of calories or more calories than the full fat versions. They also often have added sugar, starch, or salt, to make up for flavor that was removed with the fat.  · Find a way of tracking calories that works for you. Get creative. Try different apps or programs if writing down calories does not work for you.  What are some portion control tips?  · Know how many calories are in a serving. This will help you know how many servings of a certain food " you can have.  · Use a measuring cup to measure serving sizes. You could also try weighing out portions on a kitchen scale. With time, you will be able to estimate serving sizes for some foods.  · Take some time to put servings of different foods on your favorite plates, bowls, and cups so you know what a serving looks like.  · Try not to eat straight from a bag or box. Doing this can lead to overeating. Put the amount you would like to eat in a cup or on a plate to make sure you are eating the right portion.  · Use smaller plates, glasses, and bowls to prevent overeating.  · Try not to multitask (for example, watch TV or use your computer) while eating. If it is time to eat, sit down at a table and enjoy your food. This will help you to know when you are full. It will also help you to be aware of what you are eating and how much you are eating.  What are tips for following this plan?  Reading food labels   · Check the calorie count compared to the serving size. The serving size may be smaller than what you are used to eating.  · Check the source of the calories. Make sure the food you are eating is high in vitamins and protein and low in saturated and trans fats.  Shopping   · Read nutrition labels while you shop. This will help you make healthy decisions before you decide to purchase your food.  · Make a grocery list and stick to it.  Cooking   · Try to cook your favorite foods in a healthier way. For example, try baking instead of frying.  · Use low-fat dairy products.  Meal planning   · Use more fruits and vegetables. Half of your plate should be fruits and vegetables.  · Include lean proteins like poultry and fish.  How do I count calories when eating out?  · Ask for smaller portion sizes.  · Consider sharing an entree and sides instead of getting your own entree.  · If you get your own entree, eat only half. Ask for a box at the beginning of your meal and put the rest of your entree in it so you are not  tempted to eat it.  · If calories are listed on the menu, choose the lower calorie options.  · Choose dishes that include vegetables, fruits, whole grains, low-fat dairy products, and lean protein.  · Choose items that are boiled, broiled, grilled, or steamed. Stay away from items that are buttered, battered, fried, or served with cream sauce. Items labeled “crispy” are usually fried, unless stated otherwise.  · Choose water, low-fat milk, unsweetened iced tea, or other drinks without added sugar. If you want an alcoholic beverage, choose a lower calorie option such as a glass of wine or light beer.  · Ask for dressings, sauces, and syrups on the side. These are usually high in calories, so you should limit the amount you eat.  · If you want a salad, choose a garden salad and ask for grilled meats. Avoid extra toppings like gold, cheese, or fried items. Ask for the dressing on the side, or ask for olive oil and vinegar or lemon to use as dressing.  · Estimate how many servings of a food you are given. For example, a serving of cooked rice is ½ cup or about the size of half a baseball. Knowing serving sizes will help you be aware of how much food you are eating at restaurants. The list below tells you how big or small some common portion sizes are based on everyday objects:  ¨ 1 oz--4 stacked dice.  ¨ 3 oz--1 deck of cards.  ¨ 1 tsp--1 die.  ¨ 1 Tbsp--½ a ping-pong ball.  ¨ 2 Tbsp--1 ping-pong ball.  ¨ ½ cup--½ baseball.  ¨ 1 cup--1 baseball.  Summary  · Calorie counting means keeping track of how many calories you eat and drink each day. If you eat fewer calories than your body needs, you should lose weight.  · A healthy amount of weight to lose per week is usually 1-2 lb (0.5-0.9 kg). This usually means reducing your daily calorie intake by 500-750 calories.  · The number of calories in a food can be found on a Nutrition Facts label. If a food does not have a Nutrition Facts label, try to look up the calories  online or ask your dietitian for help.  · Use your calories on foods and drinks that will fill you up, and not on foods and drinks that will leave you hungry.  · Use smaller plates, glasses, and bowls to prevent overeating.  This information is not intended to replace advice given to you by your health care provider. Make sure you discuss any questions you have with your health care provider.  Document Released: 12/18/2006 Document Revised: 11/17/2017 Document Reviewed: 11/17/2017  [employer] Interactive Patient Education © 2017 [employer]     Exercising to Lose Weight  Exercising can help you to lose weight. In order to lose weight through exercise, you need to do vigorous-intensity exercise. You can tell that you are exercising with vigorous intensity if you are breathing very hard and fast and cannot hold a conversation while exercising.  Moderate-intensity exercise helps to maintain your current weight. You can tell that you are exercising at a moderate level if you have a higher heart rate and faster breathing, but you are still able to hold a conversation.  How often should I exercise?  Choose an activity that you enjoy and set realistic goals. Your health care provider can help you to make an activity plan that works for you. Exercise regularly as directed by your health care provider. This may include:  · Doing resistance training twice each week, such as:  ¨ Push-ups.  ¨ Sit-ups.  ¨ Lifting weights.  ¨ Using resistance bands.  · Doing a given intensity of exercise for a given amount of time. Choose from these options:  ¨ 150 minutes of moderate-intensity exercise every week.  ¨ 75 minutes of vigorous-intensity exercise every week.  ¨ A mix of moderate-intensity and vigorous-intensity exercise every week.  Children, pregnant women, people who are out of shape, people who are overweight, and older adults may need to consult a health care provider for individual recommendations. If you have any sort of  medical condition, be sure to consult your health care provider before starting a new exercise program.  What are some activities that can help me to lose weight?  · Walking at a rate of at least 4.5 miles an hour.  · Jogging or running at a rate of 5 miles per hour.  · Biking at a rate of at least 10 miles per hour.  · Lap swimming.  · Roller-skating or in-line skating.  · Cross-country skiing.  · Vigorous competitive sports, such as football, basketball, and soccer.  · Jumping rope.  · Aerobic dancing.  How can I be more active in my day-to-day activities?  · Use the stairs instead of the elevator.  · Take a walk during your lunch break.  · If you drive, park your car farther away from work or school.  · If you take public transportation, get off one stop early and walk the rest of the way.  · Make all of your phone calls while standing up and walking around.  · Get up, stretch, and walk around every 30 minutes throughout the day.  What guidelines should I follow while exercising?  · Do not exercise so much that you hurt yourself, feel dizzy, or get very short of breath.  · Consult your health care provider prior to starting a new exercise program.  · Wear comfortable clothes and shoes with good support.  · Drink plenty of water while you exercise to prevent dehydration or heat stroke. Body water is lost during exercise and must be replaced.  · Work out until you breathe faster and your heart beats faster.  This information is not intended to replace advice given to you by your health care provider. Make sure you discuss any questions you have with your health care provider.  Document Released: 01/20/2012 Document Revised: 05/25/2017 Document Reviewed: 05/21/2015  [employer] Interactive Patient Education © 2017 [employer]

## 2018-07-26 ENCOUNTER — OFFICE VISIT (OUTPATIENT)
Dept: GASTROENTEROLOGY | Facility: CLINIC | Age: 68
End: 2018-07-26

## 2018-07-26 ENCOUNTER — TELEPHONE (OUTPATIENT)
Dept: GASTROENTEROLOGY | Facility: CLINIC | Age: 68
End: 2018-07-26

## 2018-07-26 VITALS
TEMPERATURE: 98 F | HEART RATE: 70 BPM | DIASTOLIC BLOOD PRESSURE: 76 MMHG | BODY MASS INDEX: 39.89 KG/M2 | OXYGEN SATURATION: 96 % | WEIGHT: 301 LBS | SYSTOLIC BLOOD PRESSURE: 136 MMHG | HEIGHT: 73 IN

## 2018-07-26 DIAGNOSIS — K21.9 GASTROESOPHAGEAL REFLUX DISEASE, ESOPHAGITIS PRESENCE NOT SPECIFIED: ICD-10-CM

## 2018-07-26 DIAGNOSIS — R14.0 ABDOMINAL BLOATING: Primary | ICD-10-CM

## 2018-07-26 PROCEDURE — 99213 OFFICE O/P EST LOW 20 MIN: CPT | Performed by: NURSE PRACTITIONER

## 2018-07-26 NOTE — PROGRESS NOTES
"Chief Complaint   Patient presents with   • GI Problem     having heartburn gas and diarrhea had us7-9-18       Subjective     HPI  Daily diarrhea for several years.  Stool described as loose, he rarely experiences formed stool.  He has experienced diarrhea since last cscope, it is not worse since cscope.  No rectal bleeding. He has frequent abdominal bloating with a pain in RUQ.  He states he often feels a \"bubbling\" sensation in RUQ.  No rectal bleeding.  More heartburn.  Protonix not providing relief.    Hx of colovesical fistula repair March 2015.  Positive family hx of CRC in mother.    US abdomen 7/9/18-unremarkable    CScope (Dr Baires) 10/2015-normal  CScope (Dr Randle) 2/2015-tubular adenoma ascending colon, tubulovillous adenoma-transverse colon  CScope (Dr Baires) 2012-tubular adenoma    Past Medical History:   Diagnosis Date   • Allergic rhinitis 5/16/2017   • Atrial fibrillation (CMS/HCC) 01/11/2017   • Diverticulitis    • DM (diabetes mellitus) (CMS/HCC) 01/11/2017   • HLD (hyperlipidemia) 01/11/2017   • HTN (hypertension) 01/11/2017   • Kidney disease 01/11/2017   • Obesity 01/11/2017   • BOB (obstructive sleep apnea) 01/11/2017   • Salivary gland infections    • Sialolithiasis 1/12/2017       Past Surgical History:   Procedure Laterality Date   • BACK SURGERY     • BLADDER REPAIR     • CARDIOVERSION     • COLON SURGERY     • COLONOSCOPY  10/05/2015    scant amount of left-sided diverticulosis  otherwise normal postoperative colonoscopy   • EYE SURGERY     • HERNIA REPAIR         Outpatient Prescriptions Marked as Taking for the 7/26/18 encounter (Office Visit) with ARELI Vidal   Medication Sig Dispense Refill   • allopurinol (ZYLOPRIM) 100 MG tablet Take 100 mg by mouth Daily.     • aspirin 81 MG EC tablet Take 81 mg by mouth Daily.     • carvedilol (COREG) 25 MG tablet Take 25 mg by mouth 2 (Two) Times a Day With Meals.     • eszopiclone (LUNESTA) 3 MG tablet Take 3 mg by mouth " Every Night. Take immediately before bedtime     • furosemide (LASIX) 20 MG tablet Take 20 mg by mouth Daily. Begin taking 2 pills daily     • LORazepam (ATIVAN) 0.5 MG tablet Take 0.5 mg by mouth every night at bedtime.     • magnesium oxide (MAGOX) 400 (241.3 MG) MG tablet tablet Take 400 mg by mouth 3 (Three) Times a Day.     • Multiple Vitamin (MULTIVITAMINS PO) Take  by mouth.     • NIFEdipine XL (PROCARDIA XL) 30 MG 24 hr tablet Take 30 mg by mouth Daily.     • Omega-3 Fatty Acids (FISH OIL) 1000 MG capsule capsule Take  by mouth 2 (Two) Times a Day With Meals.     • pantoprazole (PROTONIX) 40 MG EC tablet Take 40 mg by mouth Daily.     • potassium chloride (K-DUR,KLOR-CON) 10 MEQ CR tablet Take 1 tablet by mouth.     • Probiotic Product (PROBIOTIC PO) Take  by mouth.     • QUEtiapine (SEROquel) 25 MG tablet Take 25 mg by mouth Every Night.         Allergies   Allergen Reactions   • Lisinopril Cough      -    • Ace Inhibitors Cough   • Bee Venom Hives   • Nsaids Other (See Comments)     liver   • Wasp Venom Hives       Social History     Social History   • Marital status:      Spouse name: N/A   • Number of children: N/A   • Years of education: N/A     Occupational History   • Not on file.     Social History Main Topics   • Smoking status: Former Smoker     Types: Cigarettes     Quit date: 1993   • Smokeless tobacco: Never Used   • Alcohol use 0.6 oz/week     1 Glasses of wine per week      Comment: Moderate    • Drug use: No   • Sexual activity: Defer     Other Topics Concern   • Not on file     Social History Narrative   • No narrative on file       Family History   Problem Relation Age of Onset   • Hypertension Mother    • Cancer Mother    • Colon cancer Mother    • Cancer Father    • Cancer Sister    • Cancer Brother        Review of Systems   Constitutional: Negative for fatigue, fever and unexpected weight change.   HENT: Negative for hearing loss, sore throat and voice change.    Eyes: Negative  "for visual disturbance.   Respiratory: Negative for cough, shortness of breath and wheezing.    Cardiovascular: Negative for chest pain and palpitations.   Gastrointestinal: Positive for diarrhea. Negative for abdominal pain, blood in stool and vomiting.   Endocrine: Negative for polydipsia and polyuria.   Genitourinary: Negative for difficulty urinating, dysuria, hematuria and urgency.   Musculoskeletal: Negative for joint swelling and myalgias.   Skin: Negative for color change, rash and wound.   Neurological: Negative for dizziness, tremors, seizures and syncope.   Hematological: Does not bruise/bleed easily.   Psychiatric/Behavioral: Negative for agitation and confusion. The patient is not nervous/anxious.        Objective     Vitals:    07/26/18 1401   BP: 136/76   Pulse: 70   Temp: 98 °F (36.7 °C)   SpO2: 96%   Weight: (!) 137 kg (301 lb)   Height: 185.4 cm (73\")     Body mass index is 39.71 kg/m².    Physical Exam   Constitutional: He is oriented to person, place, and time. He appears well-developed and well-nourished. He is cooperative.   HENT:   Head: Normocephalic and atraumatic.   Eyes: Pupils are equal, round, and reactive to light. Conjunctivae are normal. No scleral icterus.   Neck: Normal range of motion. Neck supple. No JVD present. No thyroid mass and no thyromegaly present.   Cardiovascular: Normal rate, regular rhythm and normal heart sounds.  Exam reveals no gallop and no friction rub.    No murmur heard.  Pulmonary/Chest: Effort normal and breath sounds normal. No accessory muscle usage. No respiratory distress. He has no wheezes. He has no rales.   Abdominal: Soft. Normal appearance and bowel sounds are normal. He exhibits no distension, no ascites and no mass. There is no hepatosplenomegaly. There is no tenderness. There is no rebound and no guarding.   Musculoskeletal: Normal range of motion. He exhibits no edema or tenderness.     Vascular Status -  His right foot exhibits normal foot " vasculature  and no edema. His left foot exhibits normal foot vasculature  and no edema.  Lymphadenopathy:     He has no cervical adenopathy.   Neurological: He is alert and oriented to person, place, and time. He has normal strength. Gait normal.   Skin: Skin is warm, dry and intact. No rash noted.       Imaging Results (most recent)     None          Body mass index is 39.71 kg/m².    Assessment/Plan     Neal was seen today for gi problem.    Diagnoses and all orders for this visit:    Abdominal bloating  -     NM HIDA Scan With Pharmacological Intervention; Future    Gastroesophageal reflux disease, esophagitis presence not specified        * Surgery not found *    Recommended EGD, pt wished to pursue HIDA scan first  Continue Protonix, take 30 min prior to breakfast  Further recommendation pending result of HIDA scan    Patient's Body mass index is 39.71 kg/m². BMI is above normal parameters. Recommendations include: no follow-up required.      There are no Patient Instructions on file for this visit.

## 2018-07-30 ENCOUNTER — OFFICE VISIT (OUTPATIENT)
Dept: CARDIOLOGY | Facility: CLINIC | Age: 68
End: 2018-07-30

## 2018-07-30 VITALS
BODY MASS INDEX: 40.02 KG/M2 | HEIGHT: 73 IN | DIASTOLIC BLOOD PRESSURE: 80 MMHG | WEIGHT: 302 LBS | SYSTOLIC BLOOD PRESSURE: 130 MMHG | RESPIRATION RATE: 18 BRPM | HEART RATE: 68 BPM | OXYGEN SATURATION: 96 %

## 2018-07-30 DIAGNOSIS — E78.2 MIXED HYPERLIPIDEMIA: ICD-10-CM

## 2018-07-30 DIAGNOSIS — R06.09 DYSPNEA ON EXERTION: Primary | ICD-10-CM

## 2018-07-30 DIAGNOSIS — G47.33 OSA (OBSTRUCTIVE SLEEP APNEA): ICD-10-CM

## 2018-07-30 DIAGNOSIS — I48.0 PAROXYSMAL ATRIAL FIBRILLATION (HCC): ICD-10-CM

## 2018-07-30 DIAGNOSIS — IMO0001 CLASS 2 OBESITY DUE TO EXCESS CALORIES WITH SERIOUS COMORBIDITY AND BODY MASS INDEX (BMI) OF 39.0 TO 39.9 IN ADULT: ICD-10-CM

## 2018-07-30 DIAGNOSIS — I10 ESSENTIAL HYPERTENSION: ICD-10-CM

## 2018-07-30 PROCEDURE — 93000 ELECTROCARDIOGRAM COMPLETE: CPT | Performed by: NURSE PRACTITIONER

## 2018-07-30 PROCEDURE — 99214 OFFICE O/P EST MOD 30 MIN: CPT | Performed by: NURSE PRACTITIONER

## 2018-07-30 NOTE — PATIENT INSTRUCTIONS

## 2018-07-30 NOTE — PROGRESS NOTES
Subjective:     Encounter Date:07/30/2018      Patient ID: Neal Rock is a 68 y.o. male with a history of paroxysmal atrial fibrillation, BOB with CPAP use, obesity, hypertension, hyperlipidemia, chronic kidney disease, diastolic congestive heart failure. He presents today for routine follow up.     Chief Complaint: Routine yearly follow up/ shortness of breath  History of Present Illness  Shortness of breath: worse with exertion. Worse from baseline with slow progression. Denies orthopnea or PND. Noted bilateral lower extremity edema.     AFIB: denies palpitations. In sinus rhythm today. Has not had issues with paf since dccv years ago took coumadin for a short time and then was dc'd due to maintaining normal heart rhythm    The following portions of the patient's history were reviewed and updated as appropriate: allergies, current medications, past family history, past medical history, past social history and past surgical history.     Allergies   Allergen Reactions   • Lisinopril Cough      -    • Ace Inhibitors Cough   • Bee Venom Hives   • Nsaids Other (See Comments)     liver   • Wasp Venom Hives       Current Outpatient Prescriptions:   •  allopurinol (ZYLOPRIM) 100 MG tablet, Take 100 mg by mouth Daily., Disp: , Rfl:   •  aspirin 81 MG EC tablet, Take 81 mg by mouth Daily., Disp: , Rfl:   •  carvedilol (COREG) 25 MG tablet, Take 25 mg by mouth 2 (Two) Times a Day With Meals., Disp: , Rfl:   •  eszopiclone (LUNESTA) 3 MG tablet, Take 3 mg by mouth Every Night. Take immediately before bedtime, Disp: , Rfl:   •  fexofenadine (ALLEGRA) 60 MG tablet, Take 60 mg by mouth As Needed., Disp: , Rfl:   •  fluticasone (FLONASE) 50 MCG/ACT nasal spray, 2 sprays into each nostril 2 (Two) Times a Day., Disp: , Rfl:   •  furosemide (LASIX) 40 MG tablet, Take 40 mg by mouth Daily. Begin taking 2 pills daily, Disp: , Rfl:   •  LORazepam (ATIVAN) 0.5 MG tablet, Take 0.5 mg by mouth every night at bedtime., Disp: , Rfl:    •  magnesium oxide (MAGOX) 400 (241.3 MG) MG tablet tablet, Take 400 mg by mouth 3 (Three) Times a Day., Disp: , Rfl:   •  Multiple Vitamin (MULTIVITAMINS PO), Take  by mouth., Disp: , Rfl:   •  mupirocin (BACTROBAN) 2 % ointment, , Disp: , Rfl:   •  NIFEdipine XL (PROCARDIA XL) 30 MG 24 hr tablet, Take 30 mg by mouth Daily., Disp: , Rfl:   •  Omega-3 Fatty Acids (FISH OIL) 1000 MG capsule capsule, Take  by mouth 2 (Two) Times a Day With Meals., Disp: , Rfl:   •  pantoprazole (PROTONIX) 40 MG EC tablet, Take 40 mg by mouth Daily., Disp: , Rfl:   •  potassium chloride (K-DUR,KLOR-CON) 10 MEQ CR tablet, Take 1 tablet by mouth., Disp: , Rfl:   •  psyllium (KONSYL) 28.3 % pack pack, Take  by mouth Daily As Needed., Disp: , Rfl:   •  QUEtiapine (SEROquel) 100 MG tablet, Take 100 mg by mouth Every Night., Disp: , Rfl:   •  Probiotic Product (PROBIOTIC PO), Take  by mouth., Disp: , Rfl:     Past Medical History:   Diagnosis Date   • Allergic rhinitis 5/16/2017   • Atrial fibrillation (CMS/HCC) 01/11/2017   • Diverticulitis    • DM (diabetes mellitus) (CMS/HCC) 01/11/2017   • HLD (hyperlipidemia) 01/11/2017   • HTN (hypertension) 01/11/2017   • Kidney disease 01/11/2017   • Obesity 01/11/2017   • BOB (obstructive sleep apnea) 01/11/2017   • Salivary gland infections    • Sialolithiasis 1/12/2017     Family History   Problem Relation Age of Onset   • Hypertension Mother    • Cancer Mother    • Colon cancer Mother    • Cancer Father    • Cancer Sister    • Cancer Brother      Social History     Social History   • Marital status:      Spouse name: N/A   • Number of children: N/A   • Years of education: N/A     Occupational History   • Not on file.     Social History Main Topics   • Smoking status: Former Smoker     Types: Cigarettes     Quit date: 1993   • Smokeless tobacco: Never Used   • Alcohol use 0.6 oz/week     1 Glasses of wine per week      Comment: Moderate    • Drug use: No   • Sexual activity: Defer      Other Topics Concern   • Not on file     Social History Narrative   • No narrative on file     Past Surgical History:   Procedure Laterality Date   • BACK SURGERY     • BLADDER REPAIR     • CARDIOVERSION     • COLON SURGERY     • COLONOSCOPY  10/05/2015    scant amount of left-sided diverticulosis  otherwise normal postoperative colonoscopy   • EYE SURGERY     • HERNIA REPAIR         Review of Systems   Constitution: Positive for malaise/fatigue. Negative for chills, diaphoresis, fever and weakness.   Eyes: Negative for visual disturbance.   Cardiovascular: Positive for leg swelling. Negative for chest pain, dyspnea on exertion, irregular heartbeat, near-syncope, orthopnea, palpitations, paroxysmal nocturnal dyspnea and syncope.   Respiratory: Positive for shortness of breath. Negative for cough and wheezing.    Hematologic/Lymphatic: Negative for bleeding problem.   Skin: Negative for flushing and itching.   Musculoskeletal: Positive for arthritis and muscle weakness. Negative for falls.   Gastrointestinal: Positive for change in bowel habit and flatus. Negative for bloating, abdominal pain, nausea and vomiting.   Neurological: Negative for dizziness, focal weakness, headaches, light-headedness and loss of balance.   Psychiatric/Behavioral: Negative for altered mental status. The patient is not nervous/anxious.    All other systems reviewed and are negative.        ECG 12 Lead  Date/Time: 7/30/2018 4:43 PM  Performed by: CAMERON WYATT  Authorized by: CAMERON WYATT   Comparison: compared with previous ECG from 7/28/2017  Similar to previous ECG  Rhythm: sinus rhythm  Rate: normal  BPM: 68  Conduction: conduction normal  ST Segments: ST segments normal  T Waves: T waves normal  QRS axis: normal            Vitals:    07/30/18 1536   BP: 130/80   Pulse: 68   Resp: 18   SpO2: 96%     1    07/30/18  1536   Weight: (!) 137 kg (302 lb)          Objective:     Physical Exam   Constitutional: He is oriented to  person, place, and time. Vital signs are normal. He appears well-developed and well-nourished. He is cooperative. No distress.   HENT:   Head: Normocephalic and atraumatic.   Nose: Nose normal.   Mouth/Throat: Oropharynx is clear and moist. No oropharyngeal exudate.   Eyes: Conjunctivae are normal. Right eye exhibits no discharge. Left eye exhibits no discharge.   Neck: Normal range of motion. Neck supple.   Cardiovascular: Normal rate, regular rhythm and normal heart sounds.  Exam reveals no gallop and no friction rub.    No murmur heard.  Pulmonary/Chest: Effort normal. No respiratory distress. He has decreased breath sounds. He has no wheezes. He has no rhonchi. He has no rales.   Abdominal: Soft. He exhibits no distension. There is no tenderness.   Musculoskeletal: Normal range of motion. He exhibits edema (no pitting has bilateral compression stockings on). He exhibits no tenderness or deformity.   Neurological: He is alert and oriented to person, place, and time.   Skin: Skin is warm and dry. No rash noted. He is not diaphoretic. No erythema. No pallor.   Psychiatric: He has a normal mood and affect. His speech is normal and behavior is normal. His mood appears not anxious. His affect is not angry. He does not exhibit a depressed mood.   Vitals reviewed.      Lab Review:   Echo 01/2017            Assessment:          Diagnosis Plan   1. Dyspnea on exertion  Adult Transthoracic Echo Complete W/ Cont if Necessary Per Protocol   2. Paroxysmal atrial fibrillation (CMS/HCC)     3. Essential hypertension     4. Mixed hyperlipidemia     5. BOB (obstructive sleep apnea)     6. Class 2 obesity due to excess calories with serious comorbidity and body mass index (BMI) of 39.0 to 39.9 in adult            Plan:       - MAE: progressing and noted to be worse. Recheck echo as he also has lower leg edema    - PAF: in sinus rhythm, denies palpitations or feeling as if he has had rhythm change. Well controlled heart rate    -  HTN: controlled. Patient states that his BP has been better controlled this year than in a long time. Managed per his PCP.    - HLD: Managed per PCP on fish oil.    - BOB: compliant with cpap    - BMI: diet and exercise .    - RTC: obtain 2 D echo at outside facility per patient request due to where they live. Follow up in 6 weeks. Afternoon appointment per patient request.

## 2018-08-06 NOTE — TELEPHONE ENCOUNTER
Just an update. Pt may still have to have Hida Scan done here at [facility]. Waiting to hear back from David Marx @ [place] [facility] this Wednesday, 8/8/18 or the patient.       08/06/2018 09:06 AM Telephone (Incoming) [facility] [phone] Hayde Romero MA    Spoke w/David. Said they are having difficulty getting the drug to perform the Hida Scans. He said that they could keep pt on the list & David will know the day before if the pharmacy has the drug in-stock. If not, he said they could call & r/s pt. W/C pt.          08/06/2018 09:08 AM Telephone (Outgoing) Patient (ZOE QUILES) Nelly Quiles (Spouse) [phone] Hayde Romero MA    Spoke w/pt's wife. They want to wait until Wed 8/8/18 to see if [facility] will have the medication in-stock to perform Hida Scan. She said if they do not, then they would like to r/s for [facility] at that time. Will notify David @ [facility] & [facility]        08/06/2018 09:15 AM Telephone (Outgoing) [facility] [phone] Hayde Romero MA     Left msg for David that pt still wanted to stay on schedule for this Thurs. 8/9/18 at 12p. Pt will wait for his/radiology phone call on Wed 8/18/18 to see if they will be able to get medication from pharmacy to perform test. Left my cb number for any ???.        I also faxed a confirmation to David of the update per our phone conversation & copy of orders and patient's contact information again with instructions to call patient and myself to update any changes on test and/or scheduling.

## 2018-08-08 NOTE — TELEPHONE ENCOUNTER
Update:     Spoke with Dov at [facility]. He said they are still without Kenovac to do the testing, but the pharmacy doesn't get today's shipment until 2:30pm. They are waiting until then to see if they are going to be able to do Mr. Rock's Hida Scan tomorrow at 12 pm.      I told him that I would check back with them this afternoon for an update.    If they do not have the Kenovac, patient's wife has already expressed that patient wanted to go ahead and have test done here at [facility].I'll arrange that this afternoon with changing the location/orders after I make sure the pt has been notified.

## 2018-08-08 NOTE — TELEPHONE ENCOUNTER
08/08/2018 03:04 PM Telephone (Outgoing) Department [facility] RADIOLOGY [phone] Hayde Romero MA             LEFT MSG FOR A CALLBACK TO GET STATUS OF [facility] SHIPMENT TO SEE IF THEY ARE GOING TO BE ABLE TO DO PT'S HIDA SCAN TOMRROW AT 12 PM

## 2018-08-09 NOTE — TELEPHONE ENCOUNTER
08/08/2018 06:00 PM Telephone (Incoming) Department [facility] RADIOLOGY [phone] Hayde Romero MA JEFF LEFT A MESSAGE LAST NIGHT AFTER HOURS AND SAID THE [place] [facility] DID NOT ARRIVE. THEY WOULD HAVE TO R/S PT. I WILL CONTACT PT & RE-VERIFY THEY STILL WANT TO DO HIDA SCAN HERE AT [facility] & PROCEED FROM THERE.

## 2018-08-09 NOTE — TELEPHONE ENCOUNTER
08/09/2018 09:12 AM Telephone (Outgoing) Patient (ZOE QUILES) JONATHAN QUILES (Spouse) [phone] Hayde Romero MA         SPOKE W/PT'S WIFE. SHE SAID THEY HAVE R/S FOR TUESDAY, 8/14/18 @   12 PM TO DO HIDA SCAN W/[facility] AT [facility] RADIOLOGY. SAID PT IS NOT ANY BETTER/NOT ANY WORSE AT THE MOMENT. GOING TO TRY TO KEEP APPT AT [facility] SINCE IT IS CLOSER. WILL UPDATE AH.     WILL F/U [facility] RE[facility] @ [facility] RADIOLOGY MONDAY, 8/13/18 TO SEE IF SHIPMENT OF JAMMIE ARRIVES FOR THE SCHEDULED APPT.

## 2018-08-20 NOTE — TELEPHONE ENCOUNTER
08/20/2018 03:56 PM Telephone (Outgoing) Patient (ZOE QUILES) JONATHAN QUILES (Spouse) [phone] Hayde Romero MA    Pt's wife said pt did not have test done due to facility out of [facility]. She would like for him to have it done here at . I will notify and update Luis.    Will make sure the order that is in the system can be used or if I need to re-enter and pend to .                08/20/2018 03:55 PM Telephone (Outgoing) Department APRIL @ [facility] RADIOLOGY [phone] Hayde Romero MA    APRIL SAID THEY WERE NOT ABLE TO KEEP PT'S APPT 8/14 DUE TO STILL NOT HAVING ANY [facility]. SHE SAID IT HAD NOT BEEN R/S AS OF YET. WILL CALL PT.

## 2018-08-20 NOTE — TELEPHONE ENCOUNTER
I have changed to internal from outgoing referral.    No PA's are needed for primary or secondary insurance.    I have forwarded the order to Reg/Sched to please contact pt and sched Hida Scan test for [facility]. Pt had US Abd Complete on 7/9/18 at [facility]-it's in Epic.  Pt would like an appointment as soon as possible.  Pt needs appt to be around 12 pm or later due to distance.     Will f/u tomorrow 8/21/18.    Will update Luis.

## 2018-08-27 NOTE — TELEPHONE ENCOUNTER
Patient is scheduled for 8/28/18 at 1:00 pm for [facility] at the [facility] for his hida scan. Will update ARELI Webb.    Tue 08/28/18   01:00 PM BH PAD NUC MED BIC PAD BIC NM 1 [place] PAD BIC HEPATOBILIARY SCAN Scheduled

## 2018-08-28 ENCOUNTER — HOSPITAL ENCOUNTER (OUTPATIENT)
Dept: NUCLEAR MEDICINE | Facility: HOSPITAL | Age: 68
Discharge: HOME | End: 2018-08-28

## 2018-08-28 DIAGNOSIS — R14.0 ABDOMINAL BLOATING: ICD-10-CM

## 2018-08-28 PROCEDURE — 0 TECHNETIUM TC 99M MEBROFENIN KIT: Performed by: NURSE PRACTITIONER

## 2018-08-28 PROCEDURE — A9537 TC99M MEBROFENIN: HCPCS | Performed by: NURSE PRACTITIONER

## 2018-08-28 PROCEDURE — 78226 HEPATOBILIARY SYSTEM IMAGING: CPT

## 2018-08-28 RX ORDER — KIT FOR THE PREPARATION OF TECHNETIUM TC 99M MEBROFENIN 45 MG/10ML
1 INJECTION, POWDER, LYOPHILIZED, FOR SOLUTION INTRAVENOUS
Status: COMPLETED | OUTPATIENT
Start: 2018-08-28 | End: 2018-08-28

## 2018-08-28 RX ADMIN — MEBROFENIN 1 DOSE: 45 INJECTION, POWDER, LYOPHILIZED, FOR SOLUTION INTRAVENOUS at 13:19

## 2018-08-31 ENCOUNTER — TELEPHONE (OUTPATIENT)
Dept: GASTROENTEROLOGY | Facility: CLINIC | Age: 68
End: 2018-08-31

## 2018-09-05 ENCOUNTER — PREP FOR SURGERY (OUTPATIENT)
Dept: OTHER | Facility: HOSPITAL | Age: 68
End: 2018-09-05

## 2018-09-05 DIAGNOSIS — R10.10 PAIN OF UPPER ABDOMEN: Primary | ICD-10-CM

## 2018-09-06 PROBLEM — R10.10 PAIN OF UPPER ABDOMEN: Status: ACTIVE | Noted: 2018-09-06

## 2018-09-11 ENCOUNTER — LAB (OUTPATIENT)
Dept: LAB | Facility: HOSPITAL | Age: 68
End: 2018-09-11

## 2018-09-11 ENCOUNTER — OFFICE VISIT (OUTPATIENT)
Dept: CARDIOLOGY | Facility: CLINIC | Age: 68
End: 2018-09-11

## 2018-09-11 VITALS
RESPIRATION RATE: 18 BRPM | SYSTOLIC BLOOD PRESSURE: 125 MMHG | WEIGHT: 308 LBS | DIASTOLIC BLOOD PRESSURE: 80 MMHG | HEIGHT: 73 IN | BODY MASS INDEX: 40.82 KG/M2 | HEART RATE: 67 BPM | OXYGEN SATURATION: 98 %

## 2018-09-11 DIAGNOSIS — I48.0 PAROXYSMAL ATRIAL FIBRILLATION (HCC): ICD-10-CM

## 2018-09-11 DIAGNOSIS — E78.2 MIXED HYPERLIPIDEMIA: ICD-10-CM

## 2018-09-11 DIAGNOSIS — R06.02 SHORTNESS OF BREATH: Primary | ICD-10-CM

## 2018-09-11 DIAGNOSIS — R06.02 SHORTNESS OF BREATH: ICD-10-CM

## 2018-09-11 DIAGNOSIS — G47.33 OSA (OBSTRUCTIVE SLEEP APNEA): ICD-10-CM

## 2018-09-11 DIAGNOSIS — I10 ESSENTIAL HYPERTENSION: ICD-10-CM

## 2018-09-11 LAB
ANION GAP SERPL CALCULATED.3IONS-SCNC: 10 MMOL/L (ref 4–13)
BUN BLD-MCNC: 11 MG/DL (ref 5–21)
BUN/CREAT SERPL: 13.8 (ref 7–25)
CALCIUM SPEC-SCNC: 8.7 MG/DL (ref 8.4–10.4)
CHLORIDE SERPL-SCNC: 99 MMOL/L (ref 98–110)
CO2 SERPL-SCNC: 31 MMOL/L (ref 24–31)
CREAT BLD-MCNC: 0.8 MG/DL (ref 0.5–1.4)
GFR SERPL CREATININE-BSD FRML MDRD: 96 ML/MIN/1.73
GLUCOSE BLD-MCNC: 138 MG/DL (ref 70–100)
NT-PROBNP SERPL-MCNC: 208 PG/ML (ref 0–900)
POTASSIUM BLD-SCNC: 3.9 MMOL/L (ref 3.5–5.3)
SODIUM BLD-SCNC: 140 MMOL/L (ref 135–145)

## 2018-09-11 PROCEDURE — 99214 OFFICE O/P EST MOD 30 MIN: CPT | Performed by: NURSE PRACTITIONER

## 2018-09-11 PROCEDURE — 80048 BASIC METABOLIC PNL TOTAL CA: CPT | Performed by: NURSE PRACTITIONER

## 2018-09-11 PROCEDURE — 36415 COLL VENOUS BLD VENIPUNCTURE: CPT

## 2018-09-11 PROCEDURE — 93000 ELECTROCARDIOGRAM COMPLETE: CPT | Performed by: NURSE PRACTITIONER

## 2018-09-11 PROCEDURE — 83880 ASSAY OF NATRIURETIC PEPTIDE: CPT | Performed by: NURSE PRACTITIONER

## 2018-09-11 NOTE — PROGRESS NOTES
Subjective:     Encounter Date:09/11/2018      Patient ID: Neal Rock is a 68 y.o. male.    Chief Complaint: Shortness of Breath  Atrial Fibrillation   Presents for follow-up visit. Symptoms include hypertension, shortness of breath and weakness. Symptoms are negative for bradycardia, chest pain, dizziness, pacemaker problem, palpitations and syncope. The symptoms have been stable. Past medical history includes atrial fibrillation and hyperlipidemia. There are no medication compliance problems.   Shortness of Breath   This is a chronic problem. The current episode started more than 1 year ago. The problem occurs intermittently. The problem has been unchanged. Associated symptoms include leg swelling. Pertinent negatives include no abdominal pain, chest pain, fever, headaches, hemoptysis, orthopnea, PND, rash, syncope or vomiting.   Leg Swelling   This is a chronic problem. The current episode started more than 1 year ago. The problem occurs intermittently. The problem has been unchanged. Associated symptoms include weakness. Pertinent negatives include no abdominal pain, chest pain, chills, coughing, fever, headaches, nausea, rash or vomiting. Treatments tried: diuretics, compression.   Hypertension   The current episode started more than 1 year ago. The problem is controlled. Associated symptoms include shortness of breath. Pertinent negatives include no chest pain, headaches, malaise/fatigue, orthopnea, palpitations or PND.   Hyperlipidemia   This is a chronic problem. The current episode started more than 1 year ago. The problem is controlled. Recent lipid tests were reviewed and are normal. Associated symptoms include shortness of breath. Pertinent negatives include no chest pain. Current antihyperlipidemic treatment includes statins.     Patient presents today for follow up for shortness of breath- this is a chronic problem that patient reports is gradually worsening. This has been ongoing for several  years. Patient had an echo checked at last OV- which showed a normal EF, no pulmonary hypertension and no greater than mild valvular disease. Patient has sleep apnea- compliant with CPAP. Denies chest pain. Shortness of breath present at rest and on exertion. Patient also continues with swelling on his legs- wearing compression stockings. He is asking if he can increase Lasix. He reports he has not seen his PCP for this issue. He has a history of SHARMILA- requiring short term dialysis- with now normal renal function- per patient. History of paroxysmal atrial fibrillation- during acute illness.    The following portions of the patient's history were reviewed and updated as appropriate: allergies, current medications, past family history, past medical history, past social history, past surgical history and problem list.   Prior to Admission medications    Medication Sig Start Date End Date Taking? Authorizing Provider   allopurinol (ZYLOPRIM) 100 MG tablet Take 100 mg by mouth Daily.   Yes Cristofer Epstein MD   aspirin 81 MG EC tablet Take 81 mg by mouth Daily.   Yes Cristofer Epstein MD   carvedilol (COREG) 25 MG tablet Take 25 mg by mouth 2 (Two) Times a Day With Meals.   Yes Cristofer Epstein MD   eszopiclone (LUNESTA) 3 MG tablet Take 3 mg by mouth Every Night. Take immediately before bedtime   Yes Cristofer Epstein MD   fexofenadine (ALLEGRA) 60 MG tablet Take 60 mg by mouth As Needed.   Yes Cristofer Epstein MD   fluticasone (FLONASE) 50 MCG/ACT nasal spray 2 sprays into the nostril(s) as directed by provider As Needed.   Yes Cristofer Epstein MD   furosemide (LASIX) 40 MG tablet Take 40 mg by mouth Daily. Begin taking 2 pills daily   Yes Cristofer Epstein MD   LORazepam (ATIVAN) 0.5 MG tablet Take 0.5 mg by mouth every night at bedtime.   Yes Cristofer Epstein MD   magnesium oxide (MAGOX) 400 (241.3 MG) MG tablet tablet Take 400 mg by mouth 3 (Three) Times a Day.   Yes Lucian  MD Cristofer   Multiple Vitamin (MULTIVITAMINS PO) Take  by mouth.   Yes Cristofer Epstein MD   mupirocin (BACTROBAN) 2 % ointment  9/12/17  Yes Cristofer Epstein MD   NIFEdipine XL (PROCARDIA XL) 30 MG 24 hr tablet Take 30 mg by mouth Daily.   Yes Cristofer Epstein MD   Omega-3 Fatty Acids (FISH OIL) 1000 MG capsule capsule Take  by mouth 2 (Two) Times a Day With Meals.   Yes Cristofer Epstein MD   pantoprazole (PROTONIX) 40 MG EC tablet Take 40 mg by mouth Daily.   Yes Cristofer Epstein MD   potassium chloride (K-DUR,KLOR-CON) 10 MEQ CR tablet Take 1 tablet by mouth. 8/31/17  Yes Cristofer Epstein MD   Probiotic Product (PROBIOTIC PO) Take  by mouth.   Yes Cristofer Epstein MD   psyllium (KONSYL) 28.3 % pack pack Take  by mouth Daily As Needed.   Yes Cristofer Epstein MD   QUEtiapine (SEROquel) 100 MG tablet Take 100 mg by mouth Every Night.   Yes Cristofer Epstein MD     Past Medical History:   Diagnosis Date   • Allergic rhinitis 5/16/2017   • Atrial fibrillation (CMS/HCC) 01/11/2017   • Diverticulitis    • DM (diabetes mellitus) (CMS/Formerly [facility]) 01/11/2017   • HLD (hyperlipidemia) 01/11/2017   • HTN (hypertension) 01/11/2017   • Kidney disease 01/11/2017   • Obesity 01/11/2017   • BOB (obstructive sleep apnea) 01/11/2017   • Salivary gland infections    • Sialolithiasis 1/12/2017       Review of Systems   Constitution: Positive for weakness. Negative for chills, decreased appetite, fever, malaise/fatigue, weight gain and weight loss.   HENT: Negative for nosebleeds.    Eyes: Negative for visual disturbance.   Cardiovascular: Positive for leg swelling. Negative for chest pain, dyspnea on exertion, near-syncope, orthopnea, palpitations, paroxysmal nocturnal dyspnea and syncope.   Respiratory: Positive for shortness of breath. Negative for cough, hemoptysis and snoring.    Endocrine: Negative for cold intolerance and heat intolerance.   Hematologic/Lymphatic: Negative for bleeding  "problem. Does not bruise/bleed easily.   Skin: Negative for rash.   Musculoskeletal: Negative for back pain and falls.   Gastrointestinal: Negative for abdominal pain, constipation, diarrhea, heartburn, melena, nausea and vomiting.   Genitourinary: Negative for hematuria.   Neurological: Negative for dizziness, headaches and light-headedness.   Psychiatric/Behavioral: Negative for altered mental status.   Allergic/Immunologic: Negative for persistent infections.         ECG 12 Lead  Date/Time: 9/11/2018 2:15 PM  Performed by: ALISSA HARDY  Authorized by: ALISSA HARDY   Comparison: compared with previous ECG from 7/30/2018  Similar to previous ECG  Rhythm: sinus rhythm               Objective:     Physical Exam   Constitutional: He is oriented to person, place, and time. He appears well-developed and well-nourished.   HENT:   Head: Normocephalic and atraumatic.   Eyes: Pupils are equal, round, and reactive to light.   Neck: Normal range of motion. Neck supple. No JVD present. Carotid bruit is not present.   Cardiovascular: Normal rate, regular rhythm, normal heart sounds and intact distal pulses.    Pulmonary/Chest: Effort normal and breath sounds normal.   Abdominal: Soft. Bowel sounds are normal.   Musculoskeletal: Normal range of motion. He exhibits edema (trace- bilateral).   Neurological: He is alert and oriented to person, place, and time. He has normal reflexes.   Skin: Skin is warm and dry.   Psychiatric: He has a normal mood and affect. His behavior is normal. Judgment and thought content normal.     Blood pressure 125/80, pulse 67, resp. rate 18, height 185.4 cm (73\"), weight (!) 140 kg (308 lb), SpO2 98 %.      Lab Review:       Assessment:          Diagnosis Plan   1. Shortness of breath  proBNP    Basic Metabolic Panel   2. Paroxysmal atrial fibrillation (CMS/HCC)     3. Essential hypertension     4. Mixed hyperlipidemia     5. BOB (obstructive sleep apnea)     6. BMI 40.0-44.9, adult (CMS/HCC)   "          Plan:       1. Shortness of breath- chronic issue that is progressing. Will check BNP and BMP. If renal function stable will double lasix for 3 days- monitor response. Follow up with PCP. Could consider stress- normal cath in 2015.  2. Paroxysmal Atrial Fibrillation- maintaining NSR- post op A-fib. Anticoagulant stopped due to maintaining NSR  3. Blood Pressure well controlled  4. Mixed Hyperlipidemia- on statin managed by PCP  5. Obstructive Sleep Apnea - compliant with cpap  6. Patient's Body mass index is 40.64 kg/m². BMI is above normal parameters. Recommendations include: exercise counseling and nutrition counseling.

## 2018-09-13 ENCOUNTER — ANESTHESIA (OUTPATIENT)
Dept: GASTROENTEROLOGY | Facility: HOSPITAL | Age: 68
End: 2018-09-13

## 2018-09-13 ENCOUNTER — HOSPITAL ENCOUNTER (OUTPATIENT)
Facility: HOSPITAL | Age: 68
Setting detail: HOSPITAL OUTPATIENT SURGERY
Discharge: HOME | End: 2018-09-13
Attending: INTERNAL MEDICINE | Admitting: INTERNAL MEDICINE

## 2018-09-13 ENCOUNTER — ANESTHESIA EVENT (OUTPATIENT)
Dept: GASTROENTEROLOGY | Facility: HOSPITAL | Age: 68
End: 2018-09-13

## 2018-09-13 VITALS
DIASTOLIC BLOOD PRESSURE: 61 MMHG | HEIGHT: 73 IN | OXYGEN SATURATION: 93 % | RESPIRATION RATE: 20 BRPM | SYSTOLIC BLOOD PRESSURE: 138 MMHG | WEIGHT: 308 LBS | HEART RATE: 67 BPM | TEMPERATURE: 98.3 F | BODY MASS INDEX: 40.82 KG/M2

## 2018-09-13 DIAGNOSIS — R10.10 PAIN OF UPPER ABDOMEN: ICD-10-CM

## 2018-09-13 PROCEDURE — 87081 CULTURE SCREEN ONLY: CPT | Performed by: INTERNAL MEDICINE

## 2018-09-13 PROCEDURE — 43239 EGD BIOPSY SINGLE/MULTIPLE: CPT | Performed by: INTERNAL MEDICINE

## 2018-09-13 PROCEDURE — 88305 TISSUE EXAM BY PATHOLOGIST: CPT | Performed by: INTERNAL MEDICINE

## 2018-09-13 PROCEDURE — 25010000002 PROPOFOL 10 MG/ML EMULSION: Performed by: NURSE ANESTHETIST, CERTIFIED REGISTERED

## 2018-09-13 RX ORDER — LIDOCAINE HYDROCHLORIDE 20 MG/ML
INJECTION, SOLUTION INFILTRATION; PERINEURAL AS NEEDED
Status: DISCONTINUED | OUTPATIENT
Start: 2018-09-13 | End: 2018-09-13 | Stop reason: SURG

## 2018-09-13 RX ORDER — SODIUM CHLORIDE 0.9 % (FLUSH) 0.9 %
3 SYRINGE (ML) INJECTION AS NEEDED
Status: DISCONTINUED | OUTPATIENT
Start: 2018-09-13 | End: 2018-09-13 | Stop reason: HOSPADM

## 2018-09-13 RX ORDER — PROPOFOL 10 MG/ML
VIAL (ML) INTRAVENOUS AS NEEDED
Status: DISCONTINUED | OUTPATIENT
Start: 2018-09-13 | End: 2018-09-13 | Stop reason: SURG

## 2018-09-13 RX ORDER — SODIUM CHLORIDE 9 MG/ML
500 INJECTION, SOLUTION INTRAVENOUS CONTINUOUS PRN
Status: DISCONTINUED | OUTPATIENT
Start: 2018-09-13 | End: 2018-09-13 | Stop reason: HOSPADM

## 2018-09-13 RX ADMIN — SODIUM CHLORIDE: 9 INJECTION, SOLUTION INTRAVENOUS at 11:53

## 2018-09-13 RX ADMIN — PROPOFOL 50 MG: 10 INJECTION, EMULSION INTRAVENOUS at 12:09

## 2018-09-13 RX ADMIN — SODIUM CHLORIDE 500 ML: 9 INJECTION, SOLUTION INTRAVENOUS at 11:35

## 2018-09-13 RX ADMIN — LIDOCAINE HYDROCHLORIDE 0.5 ML: 10 INJECTION, SOLUTION EPIDURAL; INFILTRATION; INTRACAUDAL; PERINEURAL at 11:35

## 2018-09-13 RX ADMIN — PROPOFOL 80 MG: 10 INJECTION, EMULSION INTRAVENOUS at 12:07

## 2018-09-13 RX ADMIN — LIDOCAINE HYDROCHLORIDE 100 MG: 20 INJECTION, SOLUTION INFILTRATION; PERINEURAL at 12:07

## 2018-09-13 NOTE — ANESTHESIA PREPROCEDURE EVALUATION
Anesthesia Evaluation     Patient summary reviewed   no history of anesthetic complications:  NPO Solid Status: > 8 hours             Airway   Mallampati: II  TM distance: >3 FB  Neck ROM: full  Dental      Pulmonary    (+) sleep apnea on CPAP,   Cardiovascular   Exercise tolerance: good (4-7 METS)    Patient on routine beta blocker and Beta blocker given within 24 hours of surgery    (+) hypertension, dysrhythmias Atrial Fib, hyperlipidemia,       Neuro/Psych- negative ROS  GI/Hepatic/Renal/Endo    (+) morbid obesity, GERD,      Musculoskeletal     Abdominal    Substance History      OB/GYN          Other                        Anesthesia Plan    ASA 3     general     intravenous induction   Anesthetic plan, all risks, benefits, and alternatives have been provided, discussed and informed consent has been obtained with: patient.

## 2018-09-13 NOTE — ANESTHESIA POSTPROCEDURE EVALUATION
"Patient: Neal Rock    Procedure Summary     Date:  09/13/18 Room / Location:  [facility] ENDOSCOPY 4 / [facility]    Anesthesia Start:  1202 Anesthesia Stop:  1221    Procedure:  ESOPHAGOGASTRODUODENOSCOPY WITH ANESTHESIA (N/A ) Diagnosis:       Pain of upper abdomen      (Pain of upper abdomen [R10.10])    Surgeon:  Hayden Baires DO Provider:  Luis Palumbo CRNA    Anesthesia Type:  general ASA Status:  3          Anesthesia Type: general  Last vitals  BP   128/57 (09/13/18 1217)   Temp   98.3 °F (36.8 °C) (09/13/18 1122)   Pulse   66 (09/13/18 1217)   Resp   24 (09/13/18 1217)     SpO2   90 % (09/13/18 1217)     Post Anesthesia Care and Evaluation    Patient location during evaluation: PACU  Patient participation: complete - patient participated  Level of consciousness: awake and awake and alert  Pain score: 0  Pain management: adequate  Airway patency: patent  Anesthetic complications: No anesthetic complications    Cardiovascular status: acceptable and stable  Respiratory status: acceptable and unassisted  Hydration status: acceptable    Comments: Blood pressure 128/57, pulse 66, temperature 98.3 °F (36.8 °C), temperature source Temporal Artery , resp. rate 24, height 185.4 cm (73\"), weight (!) 140 kg (308 lb), SpO2 90 %.      "

## 2018-09-14 LAB — UREASE TISS QL: NEGATIVE

## 2018-09-17 LAB
CYTO UR: NORMAL
LAB AP CASE REPORT: NORMAL
LAB AP CLINICAL INFORMATION: NORMAL
PATH REPORT.FINAL DX SPEC: NORMAL
PATH REPORT.GROSS SPEC: NORMAL

## 2018-09-18 ENCOUNTER — TELEPHONE (OUTPATIENT)
Dept: GASTROENTEROLOGY | Facility: CLINIC | Age: 68
End: 2018-09-18

## 2018-09-18 NOTE — TELEPHONE ENCOUNTER
Pt wife called this afternoon.     States Garys stomach is burning/ pains, vomiting and diarrhea started last night. She is wondering if we can recommend anything for him?     [phone]

## 2018-09-19 ENCOUNTER — PREP FOR SURGERY (OUTPATIENT)
Dept: OTHER | Facility: HOSPITAL | Age: 68
End: 2018-09-19

## 2018-09-19 DIAGNOSIS — R19.4 ALTERED BOWEL HABITS: Primary | ICD-10-CM

## 2018-09-20 PROBLEM — R19.4 ALTERED BOWEL HABITS: Status: ACTIVE | Noted: 2018-09-20

## 2018-10-02 ENCOUNTER — ANESTHESIA EVENT (OUTPATIENT)
Dept: GASTROENTEROLOGY | Facility: HOSPITAL | Age: 68
End: 2018-10-02

## 2018-10-02 ENCOUNTER — ANESTHESIA (OUTPATIENT)
Dept: GASTROENTEROLOGY | Facility: HOSPITAL | Age: 68
End: 2018-10-02

## 2018-10-02 ENCOUNTER — TELEPHONE (OUTPATIENT)
Dept: GASTROENTEROLOGY | Facility: CLINIC | Age: 68
End: 2018-10-02

## 2018-10-02 ENCOUNTER — HOSPITAL ENCOUNTER (OUTPATIENT)
Facility: HOSPITAL | Age: 68
Setting detail: HOSPITAL OUTPATIENT SURGERY
Discharge: HOME | End: 2018-10-02
Attending: INTERNAL MEDICINE | Admitting: INTERNAL MEDICINE

## 2018-10-02 VITALS
HEIGHT: 73 IN | WEIGHT: 309 LBS | HEART RATE: 66 BPM | RESPIRATION RATE: 21 BRPM | TEMPERATURE: 98.7 F | DIASTOLIC BLOOD PRESSURE: 67 MMHG | OXYGEN SATURATION: 92 % | BODY MASS INDEX: 40.95 KG/M2 | SYSTOLIC BLOOD PRESSURE: 136 MMHG

## 2018-10-02 DIAGNOSIS — R19.4 ALTERED BOWEL HABITS: ICD-10-CM

## 2018-10-02 PROCEDURE — 25010000002 PROPOFOL 10 MG/ML EMULSION: Performed by: NURSE ANESTHETIST, CERTIFIED REGISTERED

## 2018-10-02 PROCEDURE — 88305 TISSUE EXAM BY PATHOLOGIST: CPT | Performed by: INTERNAL MEDICINE

## 2018-10-02 PROCEDURE — 45385 COLONOSCOPY W/LESION REMOVAL: CPT | Performed by: INTERNAL MEDICINE

## 2018-10-02 RX ORDER — PROPOFOL 10 MG/ML
VIAL (ML) INTRAVENOUS AS NEEDED
Status: DISCONTINUED | OUTPATIENT
Start: 2018-10-02 | End: 2018-10-02 | Stop reason: SURG

## 2018-10-02 RX ORDER — SODIUM CHLORIDE 9 MG/ML
500 INJECTION, SOLUTION INTRAVENOUS CONTINUOUS PRN
Status: DISCONTINUED | OUTPATIENT
Start: 2018-10-02 | End: 2018-10-02 | Stop reason: HOSPADM

## 2018-10-02 RX ORDER — SODIUM CHLORIDE 0.9 % (FLUSH) 0.9 %
3 SYRINGE (ML) INJECTION AS NEEDED
Status: DISCONTINUED | OUTPATIENT
Start: 2018-10-02 | End: 2018-10-02 | Stop reason: HOSPADM

## 2018-10-02 RX ADMIN — SODIUM CHLORIDE 500 ML: 9 INJECTION, SOLUTION INTRAVENOUS at 11:23

## 2018-10-02 RX ADMIN — PROPOFOL 50 MG: 10 INJECTION, EMULSION INTRAVENOUS at 11:54

## 2018-10-02 RX ADMIN — LIDOCAINE HYDROCHLORIDE 0.5 ML: 10 INJECTION, SOLUTION EPIDURAL; INFILTRATION; INTRACAUDAL; PERINEURAL at 11:23

## 2018-10-02 RX ADMIN — PROPOFOL 50 MG: 10 INJECTION, EMULSION INTRAVENOUS at 11:51

## 2018-10-02 RX ADMIN — PROPOFOL 50 MG: 10 INJECTION, EMULSION INTRAVENOUS at 11:53

## 2018-10-02 RX ADMIN — PROPOFOL 50 MG: 10 INJECTION, EMULSION INTRAVENOUS at 11:55

## 2018-10-02 RX ADMIN — PROPOFOL 50 MG: 10 INJECTION, EMULSION INTRAVENOUS at 11:59

## 2018-10-02 RX ADMIN — PROPOFOL 50 MG: 10 INJECTION, EMULSION INTRAVENOUS at 11:52

## 2018-10-02 NOTE — ANESTHESIA POSTPROCEDURE EVALUATION
Patient: Neal Rock    Procedure Summary     Date:  10/02/18 Room / Location:  [facility] ENDOSCOPY 2 /  [facility]    Anesthesia Start:  1147 Anesthesia Stop:  1205    Procedure:  COLONOSCOPY WITH ANESTHESIA (N/A ) Diagnosis:       Altered bowel habits      (Altered bowel habits [R19.4])    Surgeon:  Hayden Baires DO Provider:  Avril Rodrigues CRNA    Anesthesia Type:  general ASA Status:  3          Anesthesia Type: general  Last vitals  BP   136/67 (10/02/18 1107)   Temp   98.7 °F (37.1 °C) (10/02/18 1107)   Pulse   70 (10/02/18 1107)   Resp   24 (10/02/18 1107)     SpO2   91 % (10/02/18 1107)     Post Anesthesia Care and Evaluation    Patient location during evaluation: PHASE II  Patient participation: complete - patient participated  Level of consciousness: awake and sleepy but conscious  Pain score: 0  Pain management: adequate  Airway patency: patent  Anesthetic complications: No anesthetic complications    Cardiovascular status: acceptable  Respiratory status: acceptable  Hydration status: acceptable

## 2019-01-03 ENCOUNTER — TELEPHONE (OUTPATIENT)
Dept: CARDIOLOGY | Facility: CLINIC | Age: 69
End: 2019-01-03

## 2019-01-04 NOTE — TELEPHONE ENCOUNTER
Acceptable cardiovascular risk of planned procedure.  Can proceed with surgery with usual caution and perioperative hemodynamic and cardiac rhythm monitoring.

## 2019-03-22 NOTE — PATIENT INSTRUCTIONS
MyPlate from [employer]  The general, healthful diet is based on the 2010 Dietary Guidelines for Americans. The amount of food you need to eat from each food group depends on your age, sex, and level of physical activity and can be individualized by a dietitian. Go to ChooseMyPlate.gov for more information.  WHAT DO I NEED TO KNOW ABOUT THE MYPLATE PLAN?  · Enjoy your food, but eat less.    · Avoid oversized portions.      ½ of your plate should include fruits and vegetables.    ¼ of your plate should be grains.    ¼ of your plate should be protein.  Grains  · Make at least half of your grains whole grains.  · For a 2,000 calorie daily food plan, eat 6 oz every day.  · 1 oz is about 1 slice bread, 1 cup cereal, or ½ cup cooked rice, cereal, or pasta.  Vegetables  · Make half your plate fruits and vegetables.  · For a 2,000 calorie daily food plan, eat 2½ cups every day.  · 1 cup is about 1 cup raw or cooked vegetables or vegetable juice or 2 cups raw leafy greens.  Fruits  · Make half your plate fruits and vegetables.  · For a 2,000 calorie daily food plan, eat 2 cups every day.  · 1 cup is about 1 cup fruit or 100% fruit juice or ½ cup dried fruit.  Protein  · For a 2,000 calorie daily food plan, eat 5½ oz every day.  · 1 oz is about 1 oz meat, poultry, or fish, ¼ cup cooked beans, 1 egg, 1 Tbsp peanut butter, or ½ oz nuts or seeds.  Dairy  · Switch to fat-free or low-fat (1%) milk.  · For a 2,000 calorie daily food plan, eat 3 cups every day.  · 1 cup is about 1 cup milk or yogurt or soy milk (soy beverage), 1½ oz natural cheese, or 2 oz processed cheese.  Fats, Oils, and Empty Calories  · Only small amounts of oils are recommended.  · Empty calories are calories from solid fats or added sugars.  · Compare sodium in foods like soup, bread, and frozen meals. Choose the foods with lower numbers.  · Drink water instead of sugary drinks.  WHAT FOODS CAN I EAT?  Grains  Whole grains such as whole wheat, quinoa, millet, and  bulgur. Bread, rolls, and pasta made from whole grains. Brown or wild rice. Hot or cold cereals made from whole grains and without added sugar.  Vegetables  All fresh vegetables, especially fresh red, dark green, or orange vegetables. Peas and beans. Low-sodium frozen or canned vegetables prepared without added salt. Low-sodium vegetable juices.  Fruits  All fresh, frozen, and dried fruits. Canned fruit packed in water or fruit juice without added sugar. Fruit juices without added sugar.  Meats and Other Protein Sources  Boiled, baked, or grilled lean meat trimmed of fat. Skinless poultry. Fresh seafood and shellfish. Canned seafood packed in water. Unsalted nuts and unsalted nut butters. Tofu. Dried beans and pea. Eggs.  Dairy  Low-fat or fat-free milk, yogurt, and cheeses.   Sweets and Desserts  Frozen desserts made from low-fat milk.  Fats and Oils  Olive, peanut, and canola oils and margarine. Salad dressing and mayonnaise made from these oils.  Other  Soups and casseroles made from allowed ingredients and without added fat or salt.  The items listed above may not be a complete list of recommended foods or beverages. Contact your dietitian for more options.   WHAT FOODS ARE NOT RECOMMENDED?  Grains  Sweetened, low-fiber cereals. Packaged baked goods. Snack crackers and chips. Cheese crackers, butter crackers, and biscuits. Frozen waffles, sweet breads, doughnuts, pastries, packaged baking mixes, pancakes, cakes, and cookies.  Vegetables  Regular canned or frozen vegetables or vegetables prepared with salt. Canned tomatoes. Canned tomato sauce. Fried vegetables. Vegetables in cream sauce or cheese sauce.  Fruits  Fruits packed in syrup or made with added sugar.   Meats and Other Protein Sources  Marbled or fatty meats such as ribs. Poultry with skin. Fried meats, poultry, eggs, or fish. Sausages, hot dogs, and deli meats such as pastrami, bologna, or salami.  Dairy  Whole milk, cream, cheeses made from whole  milk, sour cream. Ice cream or yogurt made from whole milk or with added sugar.  Beverages  For adults, no more than one alcoholic drink per day. Regular soft drinks or other sugary beverages. Juice drinks.  Sweets and Desserts  Sugary or fatty desserts, candy, and other sweets.  Fats and Oils  Solid shortening or partially hydrogenated oils. Solid margarine. Margarine that contains trans fats. Butter.  The items listed above may not be a complete list of foods and beverages to avoid. Contact your dietitian for more information.     This information is not intended to replace advice given to you by your health care provider. Make sure you discuss any questions you have with your health care provider.     Document Released: 01/06/2009 Document Revised: 01/08/2016 Document Reviewed: 11/26/2014  [employer] Interactive Patient Education ©2017 [employer]   Calorie Counting for Weight Loss  Calories are energy you get from the things you eat and drink. Your body uses this energy to keep you going throughout the day. The number of calories you eat affects your weight. When you eat more calories than your body needs, your body stores the extra calories as fat. When you eat fewer calories than your body needs, your body burns fat to get the energy it needs.  Calorie counting means keeping track of how many calories you eat and drink each day. If you make sure to eat fewer calories than your body needs, you should lose weight. In order for calorie counting to work, you will need to eat the number of calories that are right for you in a day to lose a healthy amount of weight per week. A healthy amount of weight to lose per week is usually 1-2 lb (0.5-0.9 kg). A dietitian can determine how many calories you need in a day and give you suggestions on how to reach your calorie goal.   WHAT IS MY MY PLAN?  My goal is to have __________ calories per day.   If I have this many calories per day, I should lose around __________ pounds  per week.  WHAT DO I NEED TO KNOW ABOUT CALORIE COUNTING?  In order to meet your daily calorie goal, you will need to:  · Find out how many calories are in each food you would like to eat. Try to do this before you eat.  · Decide how much of the food you can eat.  · Write down what you ate and how many calories it had. Doing this is called keeping a food log.  WHERE DO I FIND CALORIE INFORMATION?  The number of calories in a food can be found on a Nutrition Facts label. Note that all the information on a label is based on a specific serving of the food. If a food does not have a Nutrition Facts label, try to look up the calories online or ask your dietitian for help.  HOW DO I DECIDE HOW MUCH TO EAT?  To decide how much of the food you can eat, you will need to consider both the number of calories in one serving and the size of one serving. This information can be found on the Nutrition Facts label. If a food does not have a Nutrition Facts label, look up the information online or ask your dietitian for help.  Remember that calories are listed per serving. If you choose to have more than one serving of a food, you will have to multiply the calories per serving by the amount of servings you plan to eat. For example, the label on a package of bread might say that a serving size is 1 slice and that there are 90 calories in a serving. If you eat 1 slice, you will have eaten 90 calories. If you eat 2 slices, you will have eaten 180 calories.  HOW DO I KEEP A FOOD LOG?  After each meal, record the following information in your food log:  · What you ate.  · How much of it you ate.  · How many calories it had.  · Then, add up your calories.  Keep your food log near you, such as in a small notebook in your pocket. Another option is to use a mobile jewel or website. Some programs will calculate calories for you and show you how many calories you have left each time you add an item to the log.  WHAT ARE SOME CALORIE COUNTING  TIPS?  · Use your calories on foods and drinks that will fill you up and not leave you hungry. Some examples of this include foods like nuts and nut butters, vegetables, lean proteins, and high-fiber foods (more than 5 g fiber per serving).  · Eat nutritious foods and avoid empty calories. Empty calories are calories you get from foods or beverages that do not have many nutrients, such as candy and soda. It is better to have a nutritious high-calorie food (such as an avocado) than a food with few nutrients (such as a bag of chips).  · Know how many calories are in the foods you eat most often. This way, you do not have to look up how many calories they have each time you eat them.  · Look out for foods that may seem like low-calorie foods but are really high-calorie foods, such as baked goods, soda, and fat-free candy.  · Pay attention to calories in drinks. Drinks such as sodas, specialty coffee drinks, alcohol, and juices have a lot of calories yet do not fill you up. Choose low-calorie drinks like water and diet drinks.  · Focus your calorie counting efforts on higher calorie items. Logging the calories in a garden salad that contains only vegetables is less important than calculating the calories in a milk shake.  · Find a way of tracking calories that works for you. Get creative. Most people who are successful find ways to keep track of how much they eat in a day, even if they do not count every calorie.  WHAT ARE SOME PORTION CONTROL TIPS?  · Know how many calories are in a serving. This will help you know how many servings of a certain food you can have.  · Use a measuring cup to measure serving sizes. This is helpful when you start out. With time, you will be able to estimate serving sizes for some foods.  · Take some time to put servings of different foods on your favorite plates, bowls, and cups so you know what a serving looks like.  · Try not to eat straight from a bag or box. Doing this can lead to  "overeating. Put the amount you would like to eat in a cup or on a plate to make sure you are eating the right portion.  · Use smaller plates, glasses, and bowls to prevent overeating. This is a quick and easy way to practice portion control. If your plate is smaller, less food can fit on it.  · Try not to multitask while eating, such as watching TV or using your computer. If it is time to eat, sit down at a table and enjoy your food. Doing this will help you to start recognizing when you are full. It will also make you more aware of what and how much you are eating.  HOW CAN I CALORIE COUNT WHEN EATING OUT?  · Ask for smaller portion sizes or child-sized portions.  · Consider sharing an entree and sides instead of getting your own entree.  · If you get your own entree, eat only half. Ask for a box at the beginning of your meal and put the rest of your entree in it so you are not tempted to eat it.  · Look for the calories on the menu. If calories are listed, choose the lower calorie options.  · Choose dishes that include vegetables, fruits, whole grains, low-fat dairy products, and lean protein. Focusing on smart food choices from each of the 5 food groups can help you stay on track at restaurants.  · Choose items that are boiled, broiled, grilled, or steamed.  · Choose water, milk, unsweetened iced tea, or other drinks without added sugars. If you want an alcoholic beverage, choose a lower calorie option. For example, a regular collette can have up to 700 calories and a glass of wine has around 150.  · Stay away from items that are buttered, battered, fried, or served with cream sauce. Items labeled \"crispy\" are usually fried, unless stated otherwise.  · Ask for dressings, sauces, and syrups on the side. These are usually very high in calories, so do not eat much of them.  · Watch out for salads. Many people think salads are a healthy option, but this is often not the case. Many salads come with gold, fried " chicken, lots of cheese, fried chips, and dressing. All of these items have a lot of calories. If you want a salad, choose a garden salad and ask for grilled meats or steak. Ask for the dressing on the side, or ask for olive oil and vinegar or lemon to use as dressing.  · Estimate how many servings of a food you are given. For example, a serving of cooked rice is ½ cup or about the size of half a tennis ball or one cupcake wrapper. Knowing serving sizes will help you be aware of how much food you are eating at restaurants. The list below tells you how big or small some common portion sizes are based on everyday objects.    1 oz--4 stacked dice.    3 oz--1 deck of cards.    1 tsp--1 dice.    1 Tbsp--½ a Ping-Pong ball.    2 Tbsp--1 Ping-Pong ball.    ½ cup--1 tennis ball or 1 cupcake wrapper.    1 cup--1 baseball.     This information is not intended to replace advice given to you by your health care provider. Make sure you discuss any questions you have with your health care provider.     Document Released: 12/18/2006 Document Revised: 01/08/2016 Document Reviewed: 10/23/2014  [employer] Interactive Patient Education ©2017 [employer]     Exercising to Lose Weight  Exercising can help you to lose weight. In order to lose weight through exercise, you need to do vigorous-intensity exercise. You can tell that you are exercising with vigorous intensity if you are breathing very hard and fast and cannot hold a conversation while exercising.  Moderate-intensity exercise helps to maintain your current weight. You can tell that you are exercising at a moderate level if you have a higher heart rate and faster breathing, but you are still able to hold a conversation.  HOW OFTEN SHOULD I EXERCISE?  Choose an activity that you enjoy and set realistic goals. Your health care provider can help you to make an activity plan that works for you. Exercise regularly as directed by your health care provider. This may include:  · Doing  resistance training twice each week, such as:    Push-ups.    Sit-ups.    Lifting weights.    Using resistance bands.  · Doing a given intensity of exercise for a given amount of time. Choose from these options:    150 minutes of moderate-intensity exercise every week.    75 minutes of vigorous-intensity exercise every week.    A mix of moderate-intensity and vigorous-intensity exercise every week.  Children, pregnant women, people who are out of shape, people who are overweight, and older adults may need to consult a health care provider for individual recommendations. If you have any sort of medical condition, be sure to consult your health care provider before starting a new exercise program.  WHAT ARE SOME ACTIVITIES THAT CAN HELP ME TO LOSE WEIGHT?   · Walking at a rate of at least 4.5 miles an hour.  · Jogging or running at a rate of 5 miles per hour.  · Biking at a rate of at least 10 miles per hour.  · Lap swimming.  · Roller-skating or in-line skating.  · Cross-country skiing.  · Vigorous competitive sports, such as football, basketball, and soccer.  · Jumping rope.  · Aerobic dancing.  HOW CAN I BE MORE ACTIVE IN MY DAY-TO-DAY ACTIVITIES?  · Use the stairs instead of the elevator.  · Take a walk during your lunch break.  · If you drive, park your car farther away from work or school.  · If you take public transportation, get off one stop early and walk the rest of the way.  · Make all of your phone calls while standing up and walking around.  · Get up, stretch, and walk around every 30 minutes throughout the day.  WHAT GUIDELINES SHOULD I FOLLOW WHILE EXERCISING?  · Do not exercise so much that you hurt yourself, feel dizzy, or get very short of breath.  · Consult your health care provider prior to starting a new exercise program.  · Wear comfortable clothes and shoes with good support.  · Drink plenty of water while you exercise to prevent dehydration or heat stroke. Body water is lost during exercise and  must be replaced.  · Work out until you breathe faster and your heart beats faster.     This information is not intended to replace advice given to you by your health care provider. Make sure you discuss any questions you have with your health care provider.     Document Released: 01/20/2012 Document Revised: 01/08/2016 Document Reviewed: 05/21/2015  [employer] Interactive Patient Education ©2017 [employer]       negative...

## 2019-04-02 ENCOUNTER — TELEPHONE (OUTPATIENT)
Dept: VASCULAR SURGERY | Age: 69
End: 2019-04-02

## 2019-04-02 NOTE — TELEPHONE ENCOUNTER
Patient cancelled his 1 yr carotid and OV with [facility] with no reschedule at this time.  Didn't give [facility] a specific reason

## 2019-04-24 ENCOUNTER — OFFICE VISIT (OUTPATIENT)
Dept: CARDIOLOGY | Facility: CLINIC | Age: 69
End: 2019-04-24

## 2019-04-24 VITALS
WEIGHT: 304 LBS | HEART RATE: 67 BPM | OXYGEN SATURATION: 97 % | SYSTOLIC BLOOD PRESSURE: 142 MMHG | BODY MASS INDEX: 40.29 KG/M2 | DIASTOLIC BLOOD PRESSURE: 80 MMHG | HEIGHT: 73 IN

## 2019-04-24 DIAGNOSIS — R06.09 DOE (DYSPNEA ON EXERTION): ICD-10-CM

## 2019-04-24 DIAGNOSIS — G47.33 OSA (OBSTRUCTIVE SLEEP APNEA): ICD-10-CM

## 2019-04-24 DIAGNOSIS — I35.0 AORTIC STENOSIS, MILD: ICD-10-CM

## 2019-04-24 DIAGNOSIS — I11.9 HYPERTENSIVE LEFT VENTRICULAR HYPERTROPHY, WITHOUT HEART FAILURE: ICD-10-CM

## 2019-04-24 DIAGNOSIS — R06.09 DYSPNEA ON EXERTION: ICD-10-CM

## 2019-04-24 DIAGNOSIS — E78.2 MIXED HYPERLIPIDEMIA: ICD-10-CM

## 2019-04-24 DIAGNOSIS — I51.89 DIASTOLIC DYSFUNCTION: ICD-10-CM

## 2019-04-24 DIAGNOSIS — I10 ESSENTIAL HYPERTENSION: ICD-10-CM

## 2019-04-24 DIAGNOSIS — I48.0 PAROXYSMAL ATRIAL FIBRILLATION (HCC): ICD-10-CM

## 2019-04-24 DIAGNOSIS — R19.4 ALTERED BOWEL HABITS: Primary | ICD-10-CM

## 2019-04-24 PROCEDURE — 93000 ELECTROCARDIOGRAM COMPLETE: CPT | Performed by: INTERNAL MEDICINE

## 2019-04-24 PROCEDURE — 99214 OFFICE O/P EST MOD 30 MIN: CPT | Performed by: INTERNAL MEDICINE

## 2019-04-24 RX ORDER — CETIRIZINE HYDROCHLORIDE 10 MG/1
10 TABLET ORAL DAILY
COMMUNITY

## 2019-04-24 RX ORDER — PHENOL 1.4 %
600 AEROSOL, SPRAY (ML) MUCOUS MEMBRANE DAILY
COMMUNITY

## 2019-04-24 RX ORDER — VALSARTAN 80 MG/1
80 TABLET ORAL DAILY
COMMUNITY

## 2019-04-24 RX ORDER — CLOPIDOGREL BISULFATE 75 MG/1
75 TABLET ORAL DAILY
COMMUNITY

## 2019-04-24 RX ORDER — TIMOLOL MALEATE 2.5 MG/ML
1 SOLUTION OPHTHALMIC DAILY
COMMUNITY

## 2019-04-24 NOTE — PROGRESS NOTES
Neal Rock  [id number]  1950  68 y.o.  male    Referring Provider: Ad Flaherty MD    Reason for  Visit: Here for routine follow up   Essential Hypertension     Paroxysmal atrial fibrillation maintaining long term  sinus rhythm   after DC Cardioversion 2016  On Plavix for renal artery stenosis    Mild chronic exertional shortness of breath on exertion relieved with rest  No significant cough or wheezing  Going on for several months or longer    No palpitations  No associated chest pain  No significant pedal edema    No fever or chills  No significant expectoration    No hemoptysis  No presyncope or syncope     Joint pain in small, medium and large joints   Sees vascular surgeon in [facility] for right renal artery stenosis    Easy fatiguability   Feels tired       History of present illness:  Neal Rock is a 68 y.o. yo male with history of Paroxysmal atrial fibrillation Essential Hypertension   who presents today for   Chief Complaint   Patient presents with   • Atrial Fibrillation     7 MON FU    • Shortness of Breath   .    History  Past Medical History:   Diagnosis Date   • Allergic rhinitis 5/16/2017   • Atrial fibrillation (CMS/HCC) 01/11/2017   • Diverticulitis    • DM (diabetes mellitus) (CMS/HCC) 01/11/2017   • HLD (hyperlipidemia) 01/11/2017   • HTN (hypertension) 01/11/2017   • Kidney disease 01/11/2017   • Obesity 01/11/2017   • BOB (obstructive sleep apnea) 01/11/2017   • Salivary gland infections    • Sialolithiasis 1/12/2017   ,   Past Surgical History:   Procedure Laterality Date   • BACK SURGERY     • BLADDER REPAIR     • CARDIOVERSION     • COLON SURGERY     • COLONOSCOPY  10/05/2015    scant amount of left-sided diverticulosis  otherwise normal postoperative colonoscopy   • COLONOSCOPY N/A 10/2/2018    Procedure: COLONOSCOPY WITH ANESTHESIA;  Surgeon: Hayden Baires DO;  Location: [facility] ENDOSCOPY;  Service: Gastroenterology   • ENDOSCOPY N/A 9/13/2018    Procedure:  ESOPHAGOGASTRODUODENOSCOPY WITH ANESTHESIA;  Surgeon: Hayden Baires DO;  Location: [facility] ENDOSCOPY;  Service: Gastroenterology   • EYE SURGERY     • HERNIA REPAIR     • KNEE SURGERY      RIGHT   ,   Family History   Problem Relation Age of Onset   • Hypertension Mother    • Cancer Mother    • Colon cancer Mother    • Cancer Father    • Cancer Sister    • Cancer Brother    ,   Social History     Tobacco Use   • Smoking status: Former Smoker     Years: 17.00     Types: Cigarettes     Last attempt to quit:      Years since quittin.3   • Smokeless tobacco: Never Used   Substance Use Topics   • Alcohol use: Yes     Alcohol/week: 0.6 oz     Types: 1 Glasses of wine per week     Comment: Moderate    • Drug use: No   ,     Medications  Current Outpatient Medications   Medication Sig Dispense Refill   • allopurinol (ZYLOPRIM) 100 MG tablet Take 100 mg by mouth Daily.     • calcium carbonate (OS-IMELDA) 600 MG tablet Take 600 mg by mouth Daily.     • carvedilol (COREG) 25 MG tablet Take 25 mg by mouth 2 (Two) Times a Day With Meals.     • cetirizine (zyrTEC) 10 MG tablet Take 10 mg by mouth Daily.     • clopidogrel (PLAVIX) 75 MG tablet Take 75 mg by mouth Daily.     • furosemide (LASIX) 40 MG tablet Take 40 mg by mouth Daily. Begin taking 2 pills daily     • LORazepam (ATIVAN) 0.5 MG tablet Take 0.5 mg by mouth every night at bedtime.     • magnesium oxide (MAGOX) 400 (241.3 MG) MG tablet tablet Take 400 mg by mouth 3 (Three) Times a Day.     • Multiple Vitamin (MULTIVITAMINS PO) Take  by mouth.     • pantoprazole (PROTONIX) 40 MG EC tablet Take 40 mg by mouth Daily.     • potassium chloride (K-DUR,KLOR-CON) 10 MEQ CR tablet Take 1 tablet by mouth.     • Probiotic Product (PROBIOTIC PO) Take  by mouth.     • QUEtiapine (SEROquel) 100 MG tablet Take 100 mg by mouth Every Night.     • timolol (TIMOPTIC-XR) 0.25 % ophthalmic gel-forming 1 drop Daily.     • valsartan (DIOVAN) 80 MG tablet Take 80 mg by mouth Daily.    "  • aspirin 81 MG EC tablet Take 81 mg by mouth Daily.     • eszopiclone (LUNESTA) 3 MG tablet Take 3 mg by mouth Every Night. Take immediately before bedtime     • fexofenadine (ALLEGRA) 60 MG tablet Take 60 mg by mouth As Needed.     • fluticasone (FLONASE) 50 MCG/ACT nasal spray 2 sprays into the nostril(s) as directed by provider As Needed.     • NIFEdipine XL (PROCARDIA XL) 30 MG 24 hr tablet Take 30 mg by mouth Daily.     • Omega-3 Fatty Acids (FISH OIL) 1000 MG capsule capsule Take  by mouth 2 (Two) Times a Day With Meals.     • psyllium (KONSYL) 28.3 % pack pack Take  by mouth Daily As Needed.       No current facility-administered medications for this visit.        Allergies:  Lisinopril; Ace inhibitors; Bee venom; Nsaids; and Wasp venom    Review of Systems  Review of Systems   Constitution: Positive for weakness and malaise/fatigue.   HENT: Negative.    Eyes: Negative.    Cardiovascular: Positive for dyspnea on exertion. Negative for chest pain, claudication, cyanosis, irregular heartbeat, leg swelling, near-syncope, orthopnea, palpitations, paroxysmal nocturnal dyspnea and syncope.   Respiratory: Negative.    Endocrine: Negative.    Hematologic/Lymphatic: Negative.    Skin: Negative.    Musculoskeletal: Positive for arthritis, back pain and stiffness.   Gastrointestinal: Negative for anorexia.   Genitourinary: Negative.    Psychiatric/Behavioral: Negative.        Objective     Physical Exam:  /80   Pulse 67   Ht 185.4 cm (72.99\")   Wt (!) 138 kg (304 lb)   SpO2 97%   BMI 40.12 kg/m²     Physical Exam   Constitutional: He appears well-developed.   HENT:   Head: Normocephalic.   Neck: Normal carotid pulses and no JVD present. No tracheal tenderness present. Carotid bruit is not present. No tracheal deviation and no edema present.   Cardiovascular: Regular rhythm, normal heart sounds and normal pulses.   Pulmonary/Chest: Effort normal. No stridor.   Abdominal: Soft.   Neurological: He is alert. " He has normal strength. No cranial nerve deficit or sensory deficit.   Skin: Skin is warm.   Psychiatric: He has a normal mood and affect. His speech is normal and behavior is normal.       Results Review:       ECG 12 Lead  Date/Time: 4/24/2019 1:34 PM  Performed by: Luís Lo MD  Authorized by: Luís Lo MD   Comparison: compared with previous ECG from 9/11/2018  Comparison to previous ECG: Poor R wave progression not seen  Rhythm: sinus rhythm  Rate: normal  Conduction: conduction normal  ST Segments: ST segments normal  T Waves: T waves normal  QRS axis: normal    Clinical impression: normal ECG            Assessment/Plan   Neal was seen today for atrial fibrillation and shortness of breath.    Diagnoses and all orders for this visit:    Altered bowel habits    BMI 40.0-44.9, adult (CMS/HCC)    Dyspnea on exertion    Mixed hyperlipidemia    Essential hypertension    BOB (obstructive sleep apnea) on CPAP and oxygen    Paroxysmal atrial fibrillation (CMS/HCC)    Hypertensive left ventricular hypertrophy, without heart failure    Aortic stenosis, mild    Diastolic dysfunction    Other orders  -     ECG 12 Lead           Plan    The current medical regimen is effective;  continue present plan and medications.     Orders Placed This Encounter   Procedures   • ECG 12 Lead     This order was created via procedure documentation   • Adult Transthoracic Echo Complete W/ Cont if Necessary Per Protocol     Standing Status:   Future     Standing Expiration Date:   4/23/2020     Scheduling Instructions:      [facility]     Order Specific Question:   Reason for exam?     Answer:   Dyspnea           ____________________________________________________________________________________________________________________________________________  Health maintenance and recommendations      Low salt/ HTN/ Heart healthy carbohydrate restricted cardiac diet   The patient is advised to reduce or avoid caffeine or other cardiac  stimulants.     Minimize or avoid  NSAID-type medications        Monitor for any signs of bleeding including red or dark stools. Fall precautions.        Advised staying uptodate with immunizations per established standard guidelines.      Offered to give patient  a copy of my notes       Questions were encouraged, asked and answered to the patient's  understanding and satisfaction. Questions if any regarding current medications and side effects, need for refills and importance of compliance to medications stressed.    Reviewed available prior notes, consults, prior visits, laboratory findings, radiology and cardiology relevant reports. Updated chart as applicable. I have reviewed the patient's medical history in detail and updated the computerized patient record as relevant.      Updated patient regarding any new or relevant abnormalities on review of records or any new findings on physical exam. Mentioned to patient about purpose of visit and desirable health short and long term goals and objectives.    Primary to monitor CBC CMP Lipid panel and TSH as applicable    ___________________________________________________________________________________________________________________________________________          Return in about 6 months (around 10/24/2019).

## 2019-11-18 ENCOUNTER — OFFICE VISIT (OUTPATIENT)
Dept: CARDIOLOGY | Facility: CLINIC | Age: 69
End: 2019-11-18

## 2019-11-18 VITALS
HEIGHT: 73 IN | SYSTOLIC BLOOD PRESSURE: 142 MMHG | BODY MASS INDEX: 41.22 KG/M2 | WEIGHT: 311 LBS | OXYGEN SATURATION: 97 % | DIASTOLIC BLOOD PRESSURE: 70 MMHG | HEART RATE: 96 BPM

## 2019-11-18 DIAGNOSIS — K21.9 GASTROESOPHAGEAL REFLUX DISEASE, ESOPHAGITIS PRESENCE NOT SPECIFIED: ICD-10-CM

## 2019-11-18 DIAGNOSIS — I11.9 HYPERTENSIVE LEFT VENTRICULAR HYPERTROPHY, WITHOUT HEART FAILURE: ICD-10-CM

## 2019-11-18 DIAGNOSIS — I35.0 AORTIC STENOSIS, MILD: ICD-10-CM

## 2019-11-18 DIAGNOSIS — R06.09 DYSPNEA ON EXERTION: ICD-10-CM

## 2019-11-18 DIAGNOSIS — I10 ESSENTIAL HYPERTENSION: Primary | ICD-10-CM

## 2019-11-18 DIAGNOSIS — I51.89 DIASTOLIC DYSFUNCTION: ICD-10-CM

## 2019-11-18 DIAGNOSIS — G47.33 OSA (OBSTRUCTIVE SLEEP APNEA): ICD-10-CM

## 2019-11-18 DIAGNOSIS — N18.9 CHRONIC KIDNEY DISEASE, UNSPECIFIED CKD STAGE: ICD-10-CM

## 2019-11-18 DIAGNOSIS — E78.2 MIXED HYPERLIPIDEMIA: ICD-10-CM

## 2019-11-18 DIAGNOSIS — I48.0 PAROXYSMAL ATRIAL FIBRILLATION (HCC): ICD-10-CM

## 2019-11-18 PROCEDURE — 93000 ELECTROCARDIOGRAM COMPLETE: CPT | Performed by: INTERNAL MEDICINE

## 2019-11-18 PROCEDURE — 99214 OFFICE O/P EST MOD 30 MIN: CPT | Performed by: INTERNAL MEDICINE

## 2019-11-18 NOTE — PROGRESS NOTES
Neal Rock  [id number]  1950  69 y.o.  male    Referring Provider: Ad Flaherty MD    Reason for  Visit: Here for routine follow up   Essential Hypertension      Subjective         Feels same overall as during last visit  No new events or complaints since last visit   Overall the patient feels no major change from baseline symptoms   Similar symptoms as during last visit        Paroxysmal atrial fibrillation maintaining long term  sinus rhythm  after DC Cardioversion 2016  On Plavix for renal artery stenosis    Mild chronic exertional shortness of breath on exertion relieved with rest  No significant cough or wheezing    No palpitations  No associated chest pain  No significant pedal edema    No fever or chills  No significant expectoration    No hemoptysis  No presyncope or syncope     Joint pain in small, medium and large joints   Sees vascular surgeon in [facility] for right renal artery stenosis    Easy fatiguability   Feels tired     Recent sinus infection      History of present illness:  Neal Rock is a 69 y.o. yo male with history of Paroxysmal atrial fibrillation Essential Hypertension   who presents today for   Chief Complaint   Patient presents with   • Atrial Fibrillation     6 MON FU    • Shortness of Breath   .    History  Past Medical History:   Diagnosis Date   • Allergic rhinitis 5/16/2017   • Atrial fibrillation (CMS/HCC) 01/11/2017   • Diverticulitis    • DM (diabetes mellitus) (CMS/HCC) 01/11/2017   • HLD (hyperlipidemia) 01/11/2017   • HTN (hypertension) 01/11/2017   • Kidney disease 01/11/2017   • Obesity 01/11/2017   • BOB (obstructive sleep apnea) 01/11/2017   • Salivary gland infections    • Sialolithiasis 1/12/2017   ,   Past Surgical History:   Procedure Laterality Date   • BACK SURGERY     • BLADDER REPAIR     • CARDIOVERSION     • COLON SURGERY     • COLONOSCOPY  10/05/2015    scant amount of left-sided diverticulosis  otherwise normal postoperative colonoscopy   •  COLONOSCOPY N/A 10/2/2018    Procedure: COLONOSCOPY WITH ANESTHESIA;  Surgeon: Hayden Baires DO;  Location: [facility] ENDOSCOPY;  Service: Gastroenterology   • ENDOSCOPY N/A 2018    Procedure: ESOPHAGOGASTRODUODENOSCOPY WITH ANESTHESIA;  Surgeon: Hayden Baires DO;  Location: [facility] ENDOSCOPY;  Service: Gastroenterology   • EYE SURGERY     • HERNIA REPAIR     • KNEE SURGERY      RIGHT   ,   Family History   Problem Relation Age of Onset   • Hypertension Mother    • Cancer Mother    • Colon cancer Mother    • Cancer Father    • Cancer Sister    • Cancer Brother    ,   Social History     Tobacco Use   • Smoking status: Former Smoker     Years: 17.00     Types: Cigarettes     Last attempt to quit:      Years since quittin.8   • Smokeless tobacco: Never Used   Substance Use Topics   • Alcohol use: Yes     Alcohol/week: 0.6 oz     Types: 1 Glasses of wine per week     Comment: Moderate    • Drug use: No   ,     Medications  Current Outpatient Medications   Medication Sig Dispense Refill   • allopurinol (ZYLOPRIM) 100 MG tablet Take 100 mg by mouth Daily.     • calcium carbonate (OS-IMELDA) 600 MG tablet Take 600 mg by mouth Daily.     • carvedilol (COREG) 25 MG tablet Take 25 mg by mouth 2 (Two) Times a Day With Meals.     • cetirizine (zyrTEC) 10 MG tablet Take 10 mg by mouth Daily.     • clopidogrel (PLAVIX) 75 MG tablet Take 75 mg by mouth Daily.     • eszopiclone (LUNESTA) 3 MG tablet Take 3 mg by mouth Every Night. Take immediately before bedtime     • fluticasone (FLONASE) 50 MCG/ACT nasal spray 2 sprays into the nostril(s) as directed by provider As Needed.     • furosemide (LASIX) 40 MG tablet Take 40 mg by mouth Daily. Begin taking 2 pills daily     • LORazepam (ATIVAN) 0.5 MG tablet Take 0.5 mg by mouth every night at bedtime.     • magnesium oxide (MAGOX) 400 (241.3 MG) MG tablet tablet Take 400 mg by mouth 3 (Three) Times a Day.     • Multiple Vitamin (MULTIVITAMINS PO) Take  by mouth.     •  "Omega-3 Fatty Acids (FISH OIL) 1000 MG capsule capsule Take  by mouth 2 (Two) Times a Day With Meals.     • pantoprazole (PROTONIX) 40 MG EC tablet Take 40 mg by mouth Daily.     • potassium chloride (K-DUR,KLOR-CON) 10 MEQ CR tablet Take 1 tablet by mouth.     • Probiotic Product (PROBIOTIC PO) Take  by mouth.     • QUEtiapine (SEROquel) 100 MG tablet Take 100 mg by mouth Every Night.     • timolol (TIMOPTIC-XR) 0.25 % ophthalmic gel-forming 1 drop Daily.     • valsartan (DIOVAN) 80 MG tablet Take 80 mg by mouth Daily.     • aspirin 81 MG EC tablet Take 81 mg by mouth Daily.     • fexofenadine (ALLEGRA) 60 MG tablet Take 60 mg by mouth As Needed.     • NIFEdipine XL (PROCARDIA XL) 30 MG 24 hr tablet Take 30 mg by mouth Daily.     • psyllium (KONSYL) 28.3 % pack pack Take  by mouth Daily As Needed.       No current facility-administered medications for this visit.        Allergies:  Lisinopril; Ace inhibitors; Bee venom; Nsaids; and Wasp venom    Review of Systems  Review of Systems   Constitution: Positive for weakness and malaise/fatigue.   HENT: Negative.    Eyes: Negative.    Cardiovascular: Positive for dyspnea on exertion. Negative for chest pain, claudication, cyanosis, irregular heartbeat, leg swelling, near-syncope, orthopnea, palpitations, paroxysmal nocturnal dyspnea and syncope.   Respiratory: Negative.    Endocrine: Negative.    Hematologic/Lymphatic: Negative.    Skin: Negative.    Musculoskeletal: Positive for arthritis, back pain and stiffness.   Gastrointestinal: Negative for anorexia.   Genitourinary: Negative.    Psychiatric/Behavioral: Negative.        Objective     Physical Exam:  /70   Pulse 96   Ht 185.4 cm (72.99\")   Wt (!) 141 kg (311 lb)   SpO2 97%   BMI 41.04 kg/m²     Physical Exam   Constitutional: He appears well-developed.   HENT:   Head: Normocephalic.   Neck: Normal carotid pulses and no JVD present. No tracheal tenderness present. Carotid bruit is not present. No tracheal " deviation and no edema present.   Cardiovascular: Regular rhythm, normal heart sounds and normal pulses.   Pulmonary/Chest: Effort normal. No stridor.   Abdominal: Soft. He exhibits no distension. There is no hepatosplenomegaly. There is no tenderness.   Neurological: He is alert. He has normal strength. No cranial nerve deficit or sensory deficit.   Skin: Skin is warm.   Psychiatric: He has a normal mood and affect. His speech is normal and behavior is normal.       Results Review:      Results for orders placed in visit on 04/26/19   SCANNED - ECHOCARDIOGRAM             ECG 12 Lead  Date/Time: 11/18/2019 12:31 PM  Performed by: Luís Lo MD  Authorized by: Luís Lo MD   Comparison: compared with previous ECG from 4/24/2019  Similar to previous ECG  Rhythm: sinus rhythm  Rate: normal  Conduction: conduction normal  ST Segments: ST segments normal  T Waves: T waves normal  QRS axis: normal  Other: no other findings    Clinical impression: normal ECG            Assessment/Plan   Neal was seen today for atrial fibrillation and shortness of breath.    Diagnoses and all orders for this visit:    Altered bowel habits    BMI 40.0-44.9, adult (CMS/HCC)    Dyspnea on exertion    Mixed hyperlipidemia    Essential hypertension    BOB (obstructive sleep apnea) on CPAP and oxygen    Paroxysmal atrial fibrillation (CMS/HCC)    Hypertensive left ventricular hypertrophy, without heart failure    Aortic stenosis, mild    Diastolic dysfunction    Other orders  -     ECG 12 Lead           Plan    The current medical regimen is effective;  continue present plan and medications.       Check BP and heart rates twice daily at least 3x / week at home and bring a recording for me to review next visit  If BP >140/90 or < 100/60 persistently over 3 reading 30 mins apart call sooner        ____________________________________________________________________________________________________________________________________________  Health maintenance and recommendations      Similar recommendations as last visit     Offered to give patient  a copy of my notes       Questions were encouraged, asked and answered to the patient's  understanding and satisfaction. Questions if any regarding current medications and side effects, need for refills and importance of compliance to medications stressed.    Reviewed available prior notes, consults, prior visits, laboratory findings, radiology and cardiology relevant reports. Updated chart as applicable. I have reviewed the patient's medical history in detail and updated the computerized patient record as relevant.      Updated patient regarding any new or relevant abnormalities on review of records or any new findings on physical exam. Mentioned to patient about purpose of visit and desirable health short and long term goals and objectives.    Primary to monitor CBC CMP Lipid panel and TSH as applicable    ___________________________________________________________________________________________________________________________________________          Return in about 1 year (around 11/18/2020).

## 2020-01-01 ENCOUNTER — PROCEDURE VISIT (OUTPATIENT)
Dept: OTOLARYNGOLOGY | Facility: CLINIC | Age: 70
End: 2020-01-01

## 2020-01-01 ENCOUNTER — OFFICE VISIT (OUTPATIENT)
Dept: OTOLARYNGOLOGY | Facility: CLINIC | Age: 70
End: 2020-01-01

## 2020-01-01 ENCOUNTER — TELEPHONE (OUTPATIENT)
Dept: ONCOLOGY | Facility: CLINIC | Age: 70
End: 2020-01-01

## 2020-01-01 VITALS
HEIGHT: 73 IN | SYSTOLIC BLOOD PRESSURE: 156 MMHG | TEMPERATURE: 98.1 F | WEIGHT: 298 LBS | DIASTOLIC BLOOD PRESSURE: 84 MMHG | HEART RATE: 122 BPM | BODY MASS INDEX: 39.49 KG/M2

## 2020-01-01 VITALS
WEIGHT: 315 LBS | TEMPERATURE: 98.1 F | RESPIRATION RATE: 16 BRPM | BODY MASS INDEX: 41.75 KG/M2 | HEART RATE: 63 BPM | DIASTOLIC BLOOD PRESSURE: 89 MMHG | HEIGHT: 73 IN | SYSTOLIC BLOOD PRESSURE: 192 MMHG

## 2020-01-01 DIAGNOSIS — Z79.01 ANTICOAGULATED: ICD-10-CM

## 2020-01-01 DIAGNOSIS — J32.0 CHRONIC MAXILLARY SINUSITIS: Primary | ICD-10-CM

## 2020-01-01 DIAGNOSIS — H90.3 SENSORINEURAL HEARING LOSS (SNHL) OF BOTH EARS: Primary | ICD-10-CM

## 2020-01-01 DIAGNOSIS — Z99.89 OSA ON CPAP: ICD-10-CM

## 2020-01-01 DIAGNOSIS — G47.33 OSA ON CPAP: ICD-10-CM

## 2020-01-01 DIAGNOSIS — H69.81 DYSFUNCTION OF RIGHT EUSTACHIAN TUBE: ICD-10-CM

## 2020-01-01 DIAGNOSIS — J34.2 NASAL SEPTAL DEVIATION: ICD-10-CM

## 2020-01-01 DIAGNOSIS — H65.31 CHRONIC MUCOID OTITIS MEDIA OF RIGHT EAR: ICD-10-CM

## 2020-01-01 DIAGNOSIS — J34.1 RETENTION CYST OF NASAL SINUS: ICD-10-CM

## 2020-01-01 DIAGNOSIS — J34.89 CONCHA BULLOSA: ICD-10-CM

## 2020-01-01 DIAGNOSIS — J34.3 HYPERTROPHY OF BOTH INFERIOR NASAL TURBINATES: ICD-10-CM

## 2020-01-01 DIAGNOSIS — H90.3 SENSORINEURAL HEARING LOSS (SNHL) OF BOTH EARS: ICD-10-CM

## 2020-01-01 PROCEDURE — 99213 OFFICE O/P EST LOW 20 MIN: CPT | Performed by: OTOLARYNGOLOGY

## 2020-01-01 PROCEDURE — 99214 OFFICE O/P EST MOD 30 MIN: CPT | Performed by: PHYSICIAN ASSISTANT

## 2020-01-01 RX ORDER — GUAIFENESIN 600 MG/1
1200 TABLET, EXTENDED RELEASE ORAL EVERY 12 HOURS SCHEDULED
Qty: 60 TABLET | Refills: 3 | Status: SHIPPED | OUTPATIENT
Start: 2020-01-01 | End: 2020-01-01

## 2020-01-01 RX ORDER — LATANOPROST 50 UG/ML
1 SOLUTION/ DROPS OPHTHALMIC NIGHTLY
COMMUNITY

## 2020-01-01 RX ORDER — FAMOTIDINE 20 MG/1
20 TABLET, FILM COATED ORAL 2 TIMES DAILY
COMMUNITY

## 2020-01-01 RX ORDER — AMOXICILLIN AND CLAVULANATE POTASSIUM 875; 125 MG/1; MG/1
1 TABLET, FILM COATED ORAL EVERY 12 HOURS SCHEDULED
Qty: 20 TABLET | Refills: 0 | Status: SHIPPED | OUTPATIENT
Start: 2020-01-01 | End: 2020-01-01

## 2020-01-01 RX ORDER — AZELASTINE 1 MG/ML
2 SPRAY, METERED NASAL 2 TIMES DAILY
Qty: 30 ML | Refills: 11 | Status: SHIPPED | OUTPATIENT
Start: 2020-01-01 | End: 2020-01-01

## 2020-01-01 RX ORDER — LANSOPRAZOLE 30 MG/1
30 CAPSULE, DELAYED RELEASE ORAL DAILY
COMMUNITY

## 2020-02-24 PROBLEM — J34.3 HYPERTROPHY OF BOTH INFERIOR NASAL TURBINATES: Status: ACTIVE | Noted: 2020-01-01

## 2020-02-24 PROBLEM — H69.81 DYSFUNCTION OF RIGHT EUSTACHIAN TUBE: Status: ACTIVE | Noted: 2020-01-01

## 2020-02-24 PROBLEM — J32.0 CHRONIC MAXILLARY SINUSITIS: Status: ACTIVE | Noted: 2020-01-01

## 2020-02-24 PROBLEM — J34.2 NASAL SEPTAL DEVIATION: Status: ACTIVE | Noted: 2020-01-01

## 2020-02-24 PROBLEM — Z99.89 OSA ON CPAP: Status: ACTIVE | Noted: 2017-01-11

## 2020-02-24 PROBLEM — J34.89 CONCHA BULLOSA: Status: ACTIVE | Noted: 2020-01-01

## 2020-02-24 PROBLEM — Z79.01 ANTICOAGULATED: Status: ACTIVE | Noted: 2020-01-01

## 2020-02-24 PROBLEM — H65.31 CHRONIC MUCOID OTITIS MEDIA OF RIGHT EAR: Status: ACTIVE | Noted: 2020-01-01

## 2020-02-24 NOTE — PROGRESS NOTES
RADHA Patel     Chief Complaint   Patient presents with   • Sinus Problem        HISTORY OF PRESENT ILLNESS:     Neal Rock is a  69 y.o.  male who is here for follow up. He has had continued problems with ear fullness and ear pressure, nasal congestion, sinusitis, sinus pressure, postnasal drip and allergy. The symptoms are localized to the right ear and bilateral nose. The symptoms severity was described as: moderate The symptoms have been: chronically occuring with recent acute flair-up in November that took three rounds of antibiotics and steroids to resolve. The symptoms are aggravated by  allergy and weather change. The symptoms are improved by antibiotics and steroids. He denies  otalgia and otorrhea.    The patient has obstructive sleep apnea and has been on CPAP for many years. He recent got a new mask and has been tolerating it well.    Chronic sinus problems continually, but had an acute flair up recently in November and had the sinus CT done. He was treated with three rounds of antibiotics and steroids. He has never been allergy tested.    The patient reports no further episodes of submandibular gland swelling or pain with conservative management since in visit in 2018.    Patient will have vascular surgery with Dr. Tolentino in [place].     EXAMINATION:  CT Sinuses w/o contrast    CLINICAL HISTORY:  69 years Male,PERSISTENT SINUSITIS, HALITOSIS    COMPARISON:  There is no prior similar study available for correlation.    TECHNIQUE: Axial imaging and 2-D reformatting performed without IV contrast.  This exam was performed according to our departmental dose-optimization program, which includes automated exposure control, adjustment of the mA and/or kV according to patient size and/or use of iterative reconstruction technique.    IV CONTRAST:  No IV contrast    TOTAL DLP:  442.42 mGy-cm    FINDINGS:    No fracture or bone malalignment. No erosive changes or gross bone destruction. Large  retention cyst versus polyp opacifying the right maxillary sinus antrum measuring at least 3.1 cm. Two small left maxillary sinus retention cysts versus polyps measuring 0.5 cm and 1.0 cm. The paranasal sinuses are otherwise unremarkable. The mastoid air cells are well aerated bilaterally. Nasal passages are patent with mild rightward deviation of an intact nasal septum. Small right septal spur. The cribriform plate and joe bree are normal. Benign edie bullosa of the middle nasal turbinates bilaterally. The nasal turbinates are otherwise unremarkable. Normal ostiomeatal complexes bilaterally with patent infundibula and maxillary sinus outflow. Visualized orbits are normal.    IMPRESSION:    1.  No active disease or acute bone pathology.  2.  Large retention cyst versus polyp opacifying the right maxillary sinus antrum measuring at least 3.1 cm along with two small left maxillary sinus retention cysts and spectral polyps.  3.  Mild rightward deviation of an intact nasal septum with a small right septal spur.  4.  Benign edie bullosa formation of the middle nasal turbinates bilaterally.    Electronically signed by:  Dino Sheikh MD, Diagnostic Radiologist  1/10/2020 3:40 PM CST Workstation: [id number]                      Review of Systems   Constitutional: Negative for activity change, appetite change, chills, diaphoresis, fatigue, fever and unexpected weight change.   HENT: Positive for congestion, hearing loss and postnasal drip. Negative for ear discharge, ear pain, facial swelling, mouth sores, nosebleeds, rhinorrhea, sinus pressure, sneezing, sore throat, tinnitus, trouble swallowing and voice change.         Pressure and fullness of right ear   Eyes: Negative for pain, discharge, redness, itching and visual disturbance.   Respiratory: Positive for apnea (on CPAP). Negative for cough, choking, chest tightness, shortness of breath, wheezing and stridor.    Gastrointestinal: Negative for nausea and  vomiting.   Endocrine: Negative for cold intolerance and heat intolerance.   Musculoskeletal: Negative for arthralgias, back pain, gait problem, neck pain and neck stiffness.   Skin: Negative for rash.   Allergic/Immunologic: Positive for environmental allergies. Negative for food allergies.   Neurological: Negative for dizziness, tremors, seizures, syncope, facial asymmetry, speech difficulty, weakness, light-headedness, numbness and headaches.   Hematological: Negative for adenopathy. Does not bruise/bleed easily.   Psychiatric/Behavioral: Negative for behavioral problems, sleep disturbance and suicidal ideas. The patient is not nervous/anxious and is not hyperactive.    :    Past History:  Past Medical History:   Diagnosis Date   • Allergic rhinitis 5/16/2017   • Atrial fibrillation (CMS/HCC) 01/11/2017   • Diverticulitis    • DM (diabetes mellitus) (CMS/[facility]) 01/11/2017   • HLD (hyperlipidemia) 01/11/2017   • HTN (hypertension) 01/11/2017   • Kidney disease 01/11/2017   • Obesity 01/11/2017   • BOB (obstructive sleep apnea) 01/11/2017   • Salivary gland infections    • Sialolithiasis 1/12/2017     Past Surgical History:   Procedure Laterality Date   • BACK SURGERY     • BLADDER REPAIR     • CARDIOVERSION     • COLON SURGERY     • COLONOSCOPY  10/05/2015    scant amount of left-sided diverticulosis  otherwise normal postoperative colonoscopy   • COLONOSCOPY N/A 10/2/2018    Procedure: COLONOSCOPY WITH ANESTHESIA;  Surgeon: Hayden Baires DO;  Location: [facility] ENDOSCOPY;  Service: Gastroenterology   • ENDOSCOPY N/A 9/13/2018    Procedure: ESOPHAGOGASTRODUODENOSCOPY WITH ANESTHESIA;  Surgeon: Hayden Baires DO;  Location: [facility] ENDOSCOPY;  Service: Gastroenterology   • EYE SURGERY     • HERNIA REPAIR     • KNEE SURGERY      RIGHT     Family History   Problem Relation Age of Onset   • Hypertension Mother    • Cancer Mother    • Colon cancer Mother    • Cancer Father    • Cancer Sister    • Cancer Brother       Social History     Tobacco Use   • Smoking status: Former Smoker     Years: 17.00     Types: Cigarettes     Last attempt to quit:      Years since quittin.1   • Smokeless tobacco: Never Used   Substance Use Topics   • Alcohol use: Yes     Alcohol/week: 1.0 standard drinks     Types: 1 Glasses of wine per week     Frequency: Monthly or less     Comment: Moderate    • Drug use: No     Outpatient Medications Marked as Taking for the 20 encounter (Office Visit) with Gurdeep Upton PA   Medication Sig Dispense Refill   • allopurinol (ZYLOPRIM) 100 MG tablet Take 100 mg by mouth Daily.     • calcium carbonate (OS-IMELDA) 600 MG tablet Take 600 mg by mouth Daily.     • carvedilol (COREG) 25 MG tablet Take 25 mg by mouth 2 (Two) Times a Day With Meals.     • cetirizine (zyrTEC) 10 MG tablet Take 10 mg by mouth Daily.     • clopidogrel (PLAVIX) 75 MG tablet Take 75 mg by mouth Daily.     • eszopiclone (LUNESTA) 3 MG tablet Take 3 mg by mouth Every Night. Take immediately before bedtime     • famotidine (PEPCID) 20 MG tablet Take 20 mg by mouth 2 (Two) Times a Day.     • fluticasone (FLONASE) 50 MCG/ACT nasal spray 2 sprays into the nostril(s) as directed by provider As Needed.     • furosemide (LASIX) 40 MG tablet Take 40 mg by mouth Daily. Begin taking 2 pills daily     • lansoprazole (PREVACID) 30 MG capsule Take 30 mg by mouth Daily.     • LORazepam (ATIVAN) 0.5 MG tablet Take 0.5 mg by mouth every night at bedtime.     • magnesium oxide (MAGOX) 400 (241.3 MG) MG tablet tablet Take 400 mg by mouth 3 (Three) Times a Day.     • Multiple Vitamin (MULTIVITAMINS PO) Take  by mouth.     • Omega-3 Fatty Acids (FISH OIL) 1000 MG capsule capsule Take  by mouth 2 (Two) Times a Day With Meals.     • potassium chloride (K-DUR,KLOR-CON) 10 MEQ CR tablet Take 1 tablet by mouth.     • Probiotic Product (PROBIOTIC PO) Take  by mouth.     • QUEtiapine (SEROquel) 100 MG tablet Take 100 mg by mouth Every Night.     •  timolol (TIMOPTIC-XR) 0.25 % ophthalmic gel-forming 1 drop Daily.     • valsartan (DIOVAN) 80 MG tablet Take 80 mg by mouth Daily.       Allergies:  Lisinopril; Ace inhibitors; Bee venom; Nsaids; and Wasp venom          Vital Signs:   Vitals:    02/24/20 1320   BP: (!) 192/89   Pulse: 63   Resp: 16   Temp: 98.1 °F (36.7 °C)         EXAMINATION:   CONSTITUTIONAL: well nourished, alert, oriented, in no acute distress     COMMUNICATION AND VOICE: able to communicate normally, normal voice quality    HEAD: normocephalic, no lesions, atraumatic, no tenderness, no masses     FACE: appearance normal, no lesions, no tenderness, no deformities, facial motion symmetric    EYES: ocular motility normal, eyelids normal, orbits normal, no proptosis, conjunctiva normal , pupils equal, round     EARS:  Hearing: response to conversational voice normal bilaterally   External Ears: auricles without lesions  Otoscopic: tympanic membrane appearance normal, no lesions, no perforation, normal mobility, no fluid    NOSE:  External Nose: structure normal, no tenderness on palpation, no nasal discharge, no lesions, no evidence of trauma, nostrils patent   Intranasal Exam: nasal mucosa erythema and edema, vestibule within normal limits, inferior turbinate hypertrophy, nasal septum deviated to the right    ORAL:  Lips: upper and lower lips without lesion   Teeth: dentition within normal limits for age   Gums: gingivae healthy   Oral Mucosa: oral mucosa normal, no mucosal lesions   Floor of Mouth: Warthin’s duct patent, mucosa normal  Tongue: lingual mucosa normal without lesions, normal tongue mobility   Palate: soft and hard palates with normal mucosa and structure  Oropharynx: oropharyngeal mucosa normal    NECK: neck appearance normal    CHEST/RESPIRATORY: respiratory effort normal, normal breath sounds     CARDIOVASCULAR: rate and rhythm normal, extremities without cyanosis or edema      NEUROLOGIC/PSYCHIATRIC: oriented to time, place and  person, mood normal, affect appropriate, CN II-XII intact grossly    RESULTS REVIEW:    I have reviewed the patients old records in the chart.  I have personally reviewed the patient's ct of the sinus images.       Assessment    Diagnosis Plan   1. Chronic maxillary sinusitis  azelastine (ASTELIN) 0.1 % nasal spray   2. Hypertrophy of both inferior nasal turbinates  azelastine (ASTELIN) 0.1 % nasal spray   3. Nasal septal deviation  azelastine (ASTELIN) 0.1 % nasal spray   4. Eliana bullosa  azelastine (ASTELIN) 0.1 % nasal spray   5. Anticoagulated     6. Chronic mucoid otitis media of right ear  azelastine (ASTELIN) 0.1 % nasal spray    Comprehensive Hearing Test   7. Dysfunction of right eustachian tube  azelastine (ASTELIN) 0.1 % nasal spray    Comprehensive Hearing Test   8. BOB on CPAP     9. Sensorineural hearing loss (SNHL) of both ears  Comprehensive Hearing Test       Plan    Patient Instructions   Continue current medications as directed and will start Astelin. The patient is having vascular surgery on the legs this week with 3 subsequent surgeries over the following several weeks. Advised to call for antibiotic if sinus symptoms flair-up again and follow-up with Dr. Ritter in 4 months with audiogram to discuss sinus and ear symptoms and possible surgery if symptoms have not improved or have worsened.    New Medications Ordered This Visit   Medications   • azelastine (ASTELIN) 0.1 % nasal spray     Si sprays into the nostril(s) as directed by provider 2 (Two) Times a Day. Use in each nostril as directed     Dispense:  30 mL     Refill:  11     Orders Placed This Encounter   Procedures   • Comprehensive Hearing Test          Return in about 4 months (around 2020) for Recheck sinuses and ears with audiogram with Dr. Ritter/possible sinus surgery.    RADHA Patel  20  10:51 PM

## 2020-02-25 NOTE — PATIENT INSTRUCTIONS
Continue current medications as directed and will start Astelin. The patient is having vascular surgery on the legs this week with 3 subsequent surgeries over the following several weeks. Advised to call for antibiotic if sinus symptoms flair-up again and follow-up with Dr. Ritter in 4 months with audiogram to discuss sinus and ear symptoms and possible surgery if symptoms have not improved or have worsened.

## 2020-06-29 NOTE — PATIENT INSTRUCTIONS
Neilmed Saline Nasal Rinse  http://[email or web address]/usa/directions-for-use.php        Step 1  Please wash your hands. Fill the clean bottle with the designated volume of either distilled, micro-filtered (through 0.2 micron filter), commercially bottled or previously boiled and cooled down water. Always rinse your nasal passages with NeilMed® Sinus Rinse™ packets only. Our packets contain a mixture of USP grade sodium chloride and sodium bicarbonate. These ingredients are of the purest quality available to make the dry powder mixture. Rinsing your nasal passages with only plain water without our mixture will result in a severe burning sensation as the plain water is not physiologic for your nasal lining, even if it is appropriate for drinking. Additionally, for your safety, do not use tap or faucet water for dissolving the mixture unless it has been previously boiled for five minutes or more as boiling sterilizes the water. Other choices are distilled, micro-filtered (through 0.2 micron), commercially bottled or, as mentioned earlier, previously boiled water at lukewarm or body temperature. You can store boiled water in a clean container for seven days or more if refrigerated. Do not use non-chlorinated or non-ultra (0.2 micron) filtered well water unless it is boiled and then cooled to lukewarm or body temperature.  *You may warm the water in a microwave in increments of 5 to 10 seconds to avoid overheating the water, damaging the device or scalding your nasal passage.   Step 2  Cut the Sinus Rinse™ mixture packet at the corner and pour its contents into the bottle. Tighten the cap and tube on the bottle securely. Place one finger over the tip of the cap and shake the bottle gently to dissolve the mixture.   Step 3  Standing in front of a sink, bend forward to your comfort level and tilt your head down. Keeping your mouth open, without holding your breath, place the cap snugly against your nasal passage.  SQUEEZE BOTTLE GENTLY until the solution starts draining from the OPPOSITE nasal passage. Some may drain from your mouth. For a proper rinse, keep squeezing the bottle GENTLY until at least 1/4 to 1/2 (60 mL to 120 mL or 2 to 4 fl oz) of the bottle is used. Do not swallow the solution.   Step 4  Blow your nose very gently, without pinching nose completely to avoid pressure on eardrums. If tolerable, sniff in gently any residual solution remaining in the nasal passage once or twice, because this may clean out the posterior nasopharyngeal area, which is the area at the back of your nasal passage. At times, some solution will reach the back of your throat, so please spit it out. To help drain any residual solution, blow your nose gently while tilting your head forward and to the opposite side of the nasal passage you just rinsed.  Step 5  Now repeat steps 3 and 4 for your other nasal passage. Most users fi nd that rinsing twice a day is beneficial, similar to brushing your teeth. Many doctors recommend rinsing 3-4 times daily or for special circumstances, even rinsing up to 6 times a day is safe. Please follow your physician’s advice.

## 2020-06-29 NOTE — PROGRESS NOTES
Dov Ritter MD     Chief Complaint   Patient presents with   • Sinus Problem        HPI   Neal Rock is a  69 y.o. male who is here for follow up.  He was last seen in the office in February by the physician assistant with chronic sinusitis complaints.  He had had a CT scan that had shown an opacified right sided maxillary sinus with clear remaining sinuses.  He has a history of atrial fibrillation and chronic renal artery stenosis and is on chronic anticoagulation therapy.  He has had continued difficulty with postnasal drip.    Review of Systems   Constitutional: Negative for chills and fever.   HENT: Positive for postnasal drip and rhinorrhea. Negative for sinus pressure, sinus pain, trouble swallowing and voice change.    Eyes: Negative for discharge, redness and itching.   Respiratory: Negative for cough, choking and shortness of breath.    Gastrointestinal: Negative for diarrhea, nausea and vomiting.   Musculoskeletal: Negative for neck pain and neck stiffness.   Neurological: Negative for dizziness, light-headedness and headaches.   Hematological: Bruises/bleeds easily.   Psychiatric/Behavioral: Negative for sleep disturbance.      I have reviewed and confirmed the accuracy of the ROS as documented by the MA/LPN/RN Dov Ritter MD      Past History:   Past medical and surgical history, family history and social history reviewed and updated when appropriate.  Current medications and allergies reviewed and updated when appropriate.  Allergies:  Bee venom; Lisinopril; Wasp venom; Nsaids; and Ace inhibitors        Vital Signs:   Temp:  [98.1 °F (36.7 °C)] 98.1 °F (36.7 °C)  Heart Rate:  [122] 122  BP: (156)/(84) 156/84    Physical Exam   CONSTITUTIONAL: morbidly obese- Body mass index is 39.32 kg/m².  COMMUNICATION AND VOICE: able to communicate normally, normal voice quality  HEAD: normocephalic, no lesions, atraumatic, no tenderness, no masses   FACE: appearance normal, no lesions, no  tenderness, no deformities, facial motion symmetric  EYES: ocular motility normal, eyelids normal, orbits normal, no proptosis, conjunctiva normal , pupils equal, round  HEARING: response to conversational voice normal bilaterally   EXTERNAL EARS: auricles without lesions  EXTERNAL NOSE: structure normal, no tenderness on palpation, no nasal discharge, no lesions, no evidence of trauma, nostrils patent  INTRANASAL EXAM: nasal mucosa with mucosal congestion and erythema, nasal septum without overt anterior deviation  LIPS: structure normal, no tenderness on palpation, no lesions, no evidence of trauma  NECK: neck appearance normal  LYMPH NODES: no lymphadenopathy  CHEST/RESPIRATORY: respiratory effort normal  CARDIOVASCULAR: extremities without cyanosis or edema, no overt jugulovenous distension present  NEUROLOGIC/PSYCHIATRIC: oriented appropriately for age, mood normal, affect appropriate, cranial nerves intact grossly unless specifically mentioned above     RESULTS REVIEW:    I have reviewed the patients old records in the chart.              Assessment:    1. Chronic maxillary sinusitis    2. Retention cyst of nasal sinus    3. Nasal septal deviation               Plan:    His medical comorbidities will make sinus surgery difficult.  We will try to stick with medical management.     For the best response, use your nasal sprays every day without skipping doses. It may take several weeks before the full effect is acheived.      New Medications Ordered This Visit   Medications   • amoxicillin-clavulanate (AUGMENTIN) 875-125 MG per tablet     Sig: Take 1 tablet by mouth Every 12 (Twelve) Hours for 14 days.     Dispense:  20 tablet     Refill:  0   • guaiFENesin (MUCINEX) 600 MG 12 hr tablet     Sig: Take 2 tablets by mouth Every 12 (Twelve) Hours for 30 days.     Dispense:  60 tablet     Refill:  3          Return in about 3 months (around 9/29/2020) for follow up with midlevel.      Dov Ritter,  MD  06/29/20  16:25

## 2020-10-14 NOTE — TELEPHONE ENCOUNTER
Patients wife Nelly would like a call back from the nurse as soon as she can patient is weak, short of breath, doesn't have a appt until 10/27  Best call back number [phone]

## 2020-10-14 NOTE — TELEPHONE ENCOUNTER
Notified Nelly that there are no openings to get Neal in any sooner and he will need to go to his PCP or the ER to be evaluated.

## 2021-08-11 NOTE — TELEPHONE ENCOUNTER
[facility] IS REQUESTING CLEARANCE FOR RIGHT KNEE SCOPE WITH DR SINCLAIR. THIS IS NOT YET SCHEDULED.     PT HAS AFIB       ON ASA 81 MG  - LOV WITH NP WAS 09/11/2018     PLEASE ADVISE   no

## (undated) DEVICE — ENDOGATOR AUXILIARY WATER JET CONNECTOR: Brand: ENDOGATOR

## (undated) DEVICE — SNAR POLYP CAPTIVATOR MICROHEX 13 240CM

## (undated) DEVICE — Device: Brand: DEFENDO AIR/WATER/SUCTION AND BIOPSY VALVE

## (undated) DEVICE — THE CHANNEL CLEANING BRUSH IS A NYLON FLEXI BRUSH ATTACHED TO A FLEXIBLE PLASTIC SHEATH DESIGNED TO SAFELY REMOVE DEBRIS FROM FLEXIBLE ENDOSCOPES.

## (undated) DEVICE — TBG SMPL FLTR LINE NASL 02/C02 A/ BX/100

## (undated) DEVICE — FRCP BX RADJAW4 NDL 2.8 240 STD OG

## (undated) DEVICE — MASK,OXYGEN,MED CONC,ADLT,7' TUB, UC: Brand: PENDING

## (undated) DEVICE — SENSR O2 OXIMAX FNGR A/ 18IN NONSTR

## (undated) DEVICE — CUFF,BP,DISP,1 TUBE,ADULT,HP: Brand: MEDLINE

## (undated) DEVICE — THE SINGLE USE ETRAP – POLYP TRAP IS USED FOR SUCTION RETRIEVAL OF ENDOSCOPICALLY REMOVED POLYPS.: Brand: ETRAP

## (undated) DEVICE — CONMED SCOPE SAVER BITE BLOCK, 20X27 MM: Brand: SCOPE SAVER